# Patient Record
Sex: FEMALE | HISPANIC OR LATINO | Employment: FULL TIME | URBAN - METROPOLITAN AREA
[De-identification: names, ages, dates, MRNs, and addresses within clinical notes are randomized per-mention and may not be internally consistent; named-entity substitution may affect disease eponyms.]

---

## 2018-10-27 ENCOUNTER — HOSPITAL ENCOUNTER (INPATIENT)
Facility: MEDICAL CENTER | Age: 68
LOS: 5 days | DRG: 871 | End: 2018-11-01
Attending: EMERGENCY MEDICINE | Admitting: HOSPITALIST
Payer: MEDICARE

## 2018-10-27 ENCOUNTER — APPOINTMENT (OUTPATIENT)
Dept: RADIOLOGY | Facility: MEDICAL CENTER | Age: 68
DRG: 871 | End: 2018-10-27
Attending: EMERGENCY MEDICINE
Payer: MEDICARE

## 2018-10-27 DIAGNOSIS — N10 ACUTE PYELONEPHRITIS: ICD-10-CM

## 2018-10-27 DIAGNOSIS — E86.0 DEHYDRATION: ICD-10-CM

## 2018-10-27 DIAGNOSIS — M25.512 CHRONIC LEFT SHOULDER PAIN: ICD-10-CM

## 2018-10-27 DIAGNOSIS — A41.9 SEPSIS, DUE TO UNSPECIFIED ORGANISM: ICD-10-CM

## 2018-10-27 DIAGNOSIS — G89.29 CHRONIC LEFT SHOULDER PAIN: ICD-10-CM

## 2018-10-27 DIAGNOSIS — R79.89 ELEVATED TROPONIN: ICD-10-CM

## 2018-10-27 DIAGNOSIS — E78.5 HYPERLIPIDEMIA, UNSPECIFIED HYPERLIPIDEMIA TYPE: ICD-10-CM

## 2018-10-27 DIAGNOSIS — R73.9 HYPERGLYCEMIA: ICD-10-CM

## 2018-10-27 LAB
ALBUMIN SERPL BCP-MCNC: 4.1 G/DL (ref 3.2–4.9)
ALBUMIN/GLOB SERPL: 1.1 G/DL
ALP SERPL-CCNC: 63 U/L (ref 30–99)
ALT SERPL-CCNC: 17 U/L (ref 2–50)
ANION GAP SERPL CALC-SCNC: 13 MMOL/L (ref 0–11.9)
APPEARANCE UR: ABNORMAL
AST SERPL-CCNC: 13 U/L (ref 12–45)
BACTERIA #/AREA URNS HPF: ABNORMAL /HPF
BASOPHILS # BLD AUTO: 0.3 % (ref 0–1.8)
BASOPHILS # BLD: 0.05 K/UL (ref 0–0.12)
BILIRUB SERPL-MCNC: 0.9 MG/DL (ref 0.1–1.5)
BILIRUB UR QL STRIP.AUTO: NEGATIVE
BNP SERPL-MCNC: 49 PG/ML (ref 0–100)
BUN SERPL-MCNC: 22 MG/DL (ref 8–22)
CALCIUM SERPL-MCNC: 9.8 MG/DL (ref 8.5–10.5)
CHLORIDE SERPL-SCNC: 98 MMOL/L (ref 96–112)
CO2 SERPL-SCNC: 22 MMOL/L (ref 20–33)
COLOR UR: YELLOW
CREAT SERPL-MCNC: 1.3 MG/DL (ref 0.5–1.4)
EOSINOPHIL # BLD AUTO: 0 K/UL (ref 0–0.51)
EOSINOPHIL NFR BLD: 0 % (ref 0–6.9)
EPI CELLS #/AREA URNS HPF: ABNORMAL /HPF
ERYTHROCYTE [DISTWIDTH] IN BLOOD BY AUTOMATED COUNT: 37.7 FL (ref 35.9–50)
GLOBULIN SER CALC-MCNC: 3.7 G/DL (ref 1.9–3.5)
GLUCOSE SERPL-MCNC: 360 MG/DL (ref 65–99)
GLUCOSE UR STRIP.AUTO-MCNC: >=1000 MG/DL
HCT VFR BLD AUTO: 41.7 % (ref 37–47)
HGB BLD-MCNC: 15 G/DL (ref 12–16)
HYALINE CASTS #/AREA URNS LPF: ABNORMAL /LPF
IMM GRANULOCYTES # BLD AUTO: 0.08 K/UL (ref 0–0.11)
IMM GRANULOCYTES NFR BLD AUTO: 0.5 % (ref 0–0.9)
KETONES UR STRIP.AUTO-MCNC: ABNORMAL MG/DL
LACTATE BLD-SCNC: 1.7 MMOL/L (ref 0.5–2)
LEUKOCYTE ESTERASE UR QL STRIP.AUTO: ABNORMAL
LYMPHOCYTES # BLD AUTO: 1.2 K/UL (ref 1–4.8)
LYMPHOCYTES NFR BLD: 7.6 % (ref 22–41)
MAGNESIUM SERPL-MCNC: 1.8 MG/DL (ref 1.5–2.5)
MCH RBC QN AUTO: 31.8 PG (ref 27–33)
MCHC RBC AUTO-ENTMCNC: 36 G/DL (ref 33.6–35)
MCV RBC AUTO: 88.5 FL (ref 81.4–97.8)
MICRO URNS: ABNORMAL
MONOCYTES # BLD AUTO: 1.2 K/UL (ref 0–0.85)
MONOCYTES NFR BLD AUTO: 7.6 % (ref 0–13.4)
NEUTROPHILS # BLD AUTO: 13.36 K/UL (ref 2–7.15)
NEUTROPHILS NFR BLD: 84 % (ref 44–72)
NITRITE UR QL STRIP.AUTO: POSITIVE
NRBC # BLD AUTO: 0 K/UL
NRBC BLD-RTO: 0 /100 WBC
PH UR STRIP.AUTO: 5.5 [PH]
PLATELET # BLD AUTO: 219 K/UL (ref 164–446)
PMV BLD AUTO: 11.1 FL (ref 9–12.9)
POTASSIUM SERPL-SCNC: 3.8 MMOL/L (ref 3.6–5.5)
PROT SERPL-MCNC: 7.8 G/DL (ref 6–8.2)
PROT UR QL STRIP: 30 MG/DL
RBC # BLD AUTO: 4.71 M/UL (ref 4.2–5.4)
RBC # URNS HPF: ABNORMAL /HPF
RBC UR QL AUTO: ABNORMAL
SODIUM SERPL-SCNC: 133 MMOL/L (ref 135–145)
SP GR UR STRIP.AUTO: 1.02
TROPONIN I SERPL-MCNC: 0.23 NG/ML (ref 0–0.04)
UROBILINOGEN UR STRIP.AUTO-MCNC: 0.2 MG/DL
WBC # BLD AUTO: 15.9 K/UL (ref 4.8–10.8)
WBC #/AREA URNS HPF: ABNORMAL /HPF

## 2018-10-27 PROCEDURE — 80053 COMPREHEN METABOLIC PANEL: CPT

## 2018-10-27 PROCEDURE — 83880 ASSAY OF NATRIURETIC PEPTIDE: CPT

## 2018-10-27 PROCEDURE — 700111 HCHG RX REV CODE 636 W/ 250 OVERRIDE (IP): Performed by: EMERGENCY MEDICINE

## 2018-10-27 PROCEDURE — 84484 ASSAY OF TROPONIN QUANT: CPT

## 2018-10-27 PROCEDURE — 700105 HCHG RX REV CODE 258: Performed by: EMERGENCY MEDICINE

## 2018-10-27 PROCEDURE — 87186 SC STD MICRODIL/AGAR DIL: CPT

## 2018-10-27 PROCEDURE — 99285 EMERGENCY DEPT VISIT HI MDM: CPT

## 2018-10-27 PROCEDURE — 99223 1ST HOSP IP/OBS HIGH 75: CPT | Performed by: HOSPITALIST

## 2018-10-27 PROCEDURE — 93005 ELECTROCARDIOGRAM TRACING: CPT | Performed by: EMERGENCY MEDICINE

## 2018-10-27 PROCEDURE — 87077 CULTURE AEROBIC IDENTIFY: CPT

## 2018-10-27 PROCEDURE — 94760 N-INVAS EAR/PLS OXIMETRY 1: CPT

## 2018-10-27 PROCEDURE — 81001 URINALYSIS AUTO W/SCOPE: CPT

## 2018-10-27 PROCEDURE — 83735 ASSAY OF MAGNESIUM: CPT

## 2018-10-27 PROCEDURE — 71045 X-RAY EXAM CHEST 1 VIEW: CPT

## 2018-10-27 PROCEDURE — 96365 THER/PROPH/DIAG IV INF INIT: CPT

## 2018-10-27 PROCEDURE — 87040 BLOOD CULTURE FOR BACTERIA: CPT

## 2018-10-27 PROCEDURE — 83605 ASSAY OF LACTIC ACID: CPT

## 2018-10-27 PROCEDURE — 770006 HCHG ROOM/CARE - MED/SURG/GYN SEMI*

## 2018-10-27 PROCEDURE — 87502 INFLUENZA DNA AMP PROBE: CPT

## 2018-10-27 PROCEDURE — 85025 COMPLETE CBC W/AUTO DIFF WBC: CPT

## 2018-10-27 PROCEDURE — 87086 URINE CULTURE/COLONY COUNT: CPT

## 2018-10-27 RX ORDER — SODIUM CHLORIDE 9 MG/ML
30 INJECTION, SOLUTION INTRAVENOUS ONCE
Status: COMPLETED | OUTPATIENT
Start: 2018-10-27 | End: 2018-10-28

## 2018-10-27 RX ORDER — POLYETHYLENE GLYCOL 3350 17 G/17G
1 POWDER, FOR SOLUTION ORAL
Status: DISCONTINUED | OUTPATIENT
Start: 2018-10-27 | End: 2018-11-01 | Stop reason: HOSPADM

## 2018-10-27 RX ORDER — ONDANSETRON 2 MG/ML
4 INJECTION INTRAMUSCULAR; INTRAVENOUS EVERY 4 HOURS PRN
Status: DISCONTINUED | OUTPATIENT
Start: 2018-10-27 | End: 2018-11-01 | Stop reason: HOSPADM

## 2018-10-27 RX ORDER — SODIUM CHLORIDE 9 MG/ML
30 INJECTION, SOLUTION INTRAVENOUS
Status: DISCONTINUED | OUTPATIENT
Start: 2018-10-27 | End: 2018-10-28

## 2018-10-27 RX ORDER — SODIUM CHLORIDE 9 MG/ML
INJECTION, SOLUTION INTRAVENOUS CONTINUOUS
Status: DISCONTINUED | OUTPATIENT
Start: 2018-10-28 | End: 2018-10-28

## 2018-10-27 RX ORDER — SODIUM CHLORIDE 9 MG/ML
500 INJECTION, SOLUTION INTRAVENOUS
Status: DISCONTINUED | OUTPATIENT
Start: 2018-10-27 | End: 2018-10-30

## 2018-10-27 RX ORDER — DEXTROSE MONOHYDRATE 25 G/50ML
25 INJECTION, SOLUTION INTRAVENOUS
Status: DISCONTINUED | OUTPATIENT
Start: 2018-10-27 | End: 2018-10-28

## 2018-10-27 RX ORDER — BISACODYL 10 MG
10 SUPPOSITORY, RECTAL RECTAL
Status: DISCONTINUED | OUTPATIENT
Start: 2018-10-27 | End: 2018-11-01 | Stop reason: HOSPADM

## 2018-10-27 RX ORDER — AMOXICILLIN 250 MG
2 CAPSULE ORAL 2 TIMES DAILY
Status: DISCONTINUED | OUTPATIENT
Start: 2018-10-28 | End: 2018-11-01 | Stop reason: HOSPADM

## 2018-10-27 RX ORDER — HEPARIN SODIUM 5000 [USP'U]/ML
5000 INJECTION, SOLUTION INTRAVENOUS; SUBCUTANEOUS EVERY 8 HOURS
Status: DISCONTINUED | OUTPATIENT
Start: 2018-10-28 | End: 2018-11-01 | Stop reason: HOSPADM

## 2018-10-27 RX ORDER — ONDANSETRON 4 MG/1
4 TABLET, ORALLY DISINTEGRATING ORAL EVERY 4 HOURS PRN
Status: DISCONTINUED | OUTPATIENT
Start: 2018-10-27 | End: 2018-11-01 | Stop reason: HOSPADM

## 2018-10-27 RX ORDER — ASPIRIN 81 MG/1
324 TABLET, CHEWABLE ORAL ONCE
Status: COMPLETED | OUTPATIENT
Start: 2018-10-28 | End: 2018-10-28

## 2018-10-27 RX ADMIN — CEFTRIAXONE 2 G: 2 INJECTION, POWDER, FOR SOLUTION INTRAMUSCULAR; INTRAVENOUS at 22:15

## 2018-10-27 RX ADMIN — SODIUM CHLORIDE 2100 ML: 9 INJECTION, SOLUTION INTRAVENOUS at 22:15

## 2018-10-27 ASSESSMENT — PAIN SCALES - GENERAL: PAINLEVEL_OUTOF10: 10

## 2018-10-28 ENCOUNTER — APPOINTMENT (OUTPATIENT)
Dept: CARDIOLOGY | Facility: MEDICAL CENTER | Age: 68
DRG: 871 | End: 2018-10-28
Attending: INTERNAL MEDICINE
Payer: MEDICARE

## 2018-10-28 ENCOUNTER — APPOINTMENT (OUTPATIENT)
Dept: RADIOLOGY | Facility: MEDICAL CENTER | Age: 68
DRG: 871 | End: 2018-10-28
Attending: HOSPITALIST
Payer: MEDICARE

## 2018-10-28 PROBLEM — E78.5 HLD (HYPERLIPIDEMIA): Status: ACTIVE | Noted: 2018-10-28

## 2018-10-28 PROBLEM — I24.89 DEMAND ISCHEMIA (HCC): Status: ACTIVE | Noted: 2018-10-28

## 2018-10-28 PROBLEM — N12 PYELONEPHRITIS: Status: ACTIVE | Noted: 2018-10-28

## 2018-10-28 PROBLEM — N17.9 AKI (ACUTE KIDNEY INJURY) (HCC): Status: ACTIVE | Noted: 2018-10-28

## 2018-10-28 PROBLEM — A41.9 SEPSIS (HCC): Status: ACTIVE | Noted: 2018-10-28

## 2018-10-28 PROBLEM — J96.01 ACUTE HYPOXEMIC RESPIRATORY FAILURE (HCC): Status: ACTIVE | Noted: 2018-10-28

## 2018-10-28 PROBLEM — E11.9 DIABETES (HCC): Status: ACTIVE | Noted: 2018-10-28

## 2018-10-28 PROBLEM — I95.9 HYPOTENSION: Status: ACTIVE | Noted: 2018-10-28

## 2018-10-28 PROBLEM — E11.65 TYPE 2 DIABETES MELLITUS WITH HYPERGLYCEMIA (HCC): Status: ACTIVE | Noted: 2018-10-28

## 2018-10-28 LAB
ANION GAP SERPL CALC-SCNC: 9 MMOL/L (ref 0–11.9)
APTT PPP: 37.2 SEC (ref 24.7–36)
BUN SERPL-MCNC: 20 MG/DL (ref 8–22)
CALCIUM SERPL-MCNC: 7.3 MG/DL (ref 8.5–10.5)
CHLORIDE SERPL-SCNC: 106 MMOL/L (ref 96–112)
CHOLEST SERPL-MCNC: 105 MG/DL (ref 100–199)
CO2 SERPL-SCNC: 18 MMOL/L (ref 20–33)
CREAT SERPL-MCNC: 1.14 MG/DL (ref 0.5–1.4)
EKG IMPRESSION: NORMAL
ERYTHROCYTE [DISTWIDTH] IN BLOOD BY AUTOMATED COUNT: 41.1 FL (ref 35.9–50)
EST. AVERAGE GLUCOSE BLD GHB EST-MCNC: 243 MG/DL
FLUAV RNA SPEC QL NAA+PROBE: NEGATIVE
FLUBV RNA SPEC QL NAA+PROBE: NEGATIVE
GLUCOSE BLD-MCNC: 168 MG/DL (ref 65–99)
GLUCOSE BLD-MCNC: 200 MG/DL (ref 65–99)
GLUCOSE BLD-MCNC: 208 MG/DL (ref 65–99)
GLUCOSE SERPL-MCNC: 255 MG/DL (ref 65–99)
HBA1C MFR BLD: 10.1 % (ref 0–5.6)
HCT VFR BLD AUTO: 32.2 % (ref 37–47)
HDLC SERPL-MCNC: 21 MG/DL
HGB BLD-MCNC: 11.1 G/DL (ref 12–16)
INR PPP: 1.38 (ref 0.87–1.13)
LACTATE BLD-SCNC: 1.5 MMOL/L (ref 0.5–2)
LACTATE BLD-SCNC: 1.6 MMOL/L (ref 0.5–2)
LACTATE BLD-SCNC: 1.7 MMOL/L (ref 0.5–2)
LDLC SERPL CALC-MCNC: 60 MG/DL
LV EJECT FRACT  99904: 60
LV EJECT FRACT MOD 2C 99903: 70.75
LV EJECT FRACT MOD 4C 99902: 69.2
LV EJECT FRACT MOD BP 99901: 69.71
MCH RBC QN AUTO: 31.8 PG (ref 27–33)
MCHC RBC AUTO-ENTMCNC: 34.5 G/DL (ref 33.6–35)
MCV RBC AUTO: 92.3 FL (ref 81.4–97.8)
PLATELET # BLD AUTO: 150 K/UL (ref 164–446)
PMV BLD AUTO: 11.2 FL (ref 9–12.9)
POTASSIUM SERPL-SCNC: 3.7 MMOL/L (ref 3.6–5.5)
PROTHROMBIN TIME: 17.1 SEC (ref 12–14.6)
RBC # BLD AUTO: 3.49 M/UL (ref 4.2–5.4)
SODIUM SERPL-SCNC: 133 MMOL/L (ref 135–145)
TRIGL SERPL-MCNC: 120 MG/DL (ref 0–149)
TROPONIN I SERPL-MCNC: 0.2 NG/ML (ref 0–0.04)
TROPONIN I SERPL-MCNC: 0.58 NG/ML (ref 0–0.04)
TROPONIN I SERPL-MCNC: 0.85 NG/ML (ref 0–0.04)
TSH SERPL DL<=0.005 MIU/L-ACNC: 0.67 UIU/ML (ref 0.38–5.33)
WBC # BLD AUTO: 15.6 K/UL (ref 4.8–10.8)

## 2018-10-28 PROCEDURE — 83605 ASSAY OF LACTIC ACID: CPT

## 2018-10-28 PROCEDURE — 99223 1ST HOSP IP/OBS HIGH 75: CPT | Performed by: INTERNAL MEDICINE

## 2018-10-28 PROCEDURE — 700102 HCHG RX REV CODE 250 W/ 637 OVERRIDE(OP): Performed by: EMERGENCY MEDICINE

## 2018-10-28 PROCEDURE — 82962 GLUCOSE BLOOD TEST: CPT

## 2018-10-28 PROCEDURE — 85027 COMPLETE CBC AUTOMATED: CPT

## 2018-10-28 PROCEDURE — 700105 HCHG RX REV CODE 258: Performed by: HOSPITALIST

## 2018-10-28 PROCEDURE — 80061 LIPID PANEL: CPT

## 2018-10-28 PROCEDURE — 700102 HCHG RX REV CODE 250 W/ 637 OVERRIDE(OP): Performed by: HOSPITALIST

## 2018-10-28 PROCEDURE — 76775 US EXAM ABDO BACK WALL LIM: CPT

## 2018-10-28 PROCEDURE — 85610 PROTHROMBIN TIME: CPT

## 2018-10-28 PROCEDURE — 99233 SBSQ HOSP IP/OBS HIGH 50: CPT | Performed by: HOSPITALIST

## 2018-10-28 PROCEDURE — 84443 ASSAY THYROID STIM HORMONE: CPT

## 2018-10-28 PROCEDURE — 83036 HEMOGLOBIN GLYCOSYLATED A1C: CPT

## 2018-10-28 PROCEDURE — 700111 HCHG RX REV CODE 636 W/ 250 OVERRIDE (IP): Performed by: HOSPITALIST

## 2018-10-28 PROCEDURE — 93306 TTE W/DOPPLER COMPLETE: CPT | Mod: 26 | Performed by: INTERNAL MEDICINE

## 2018-10-28 PROCEDURE — A9270 NON-COVERED ITEM OR SERVICE: HCPCS | Performed by: HOSPITALIST

## 2018-10-28 PROCEDURE — 80048 BASIC METABOLIC PNL TOTAL CA: CPT

## 2018-10-28 PROCEDURE — 93306 TTE W/DOPPLER COMPLETE: CPT

## 2018-10-28 PROCEDURE — 84484 ASSAY OF TROPONIN QUANT: CPT

## 2018-10-28 PROCEDURE — 770022 HCHG ROOM/CARE - ICU (200)

## 2018-10-28 PROCEDURE — 85730 THROMBOPLASTIN TIME PARTIAL: CPT

## 2018-10-28 PROCEDURE — A9270 NON-COVERED ITEM OR SERVICE: HCPCS | Performed by: EMERGENCY MEDICINE

## 2018-10-28 RX ORDER — LISINOPRIL AND HYDROCHLOROTHIAZIDE 25; 20 MG/1; MG/1
1 TABLET ORAL DAILY
COMMUNITY

## 2018-10-28 RX ORDER — OXYCODONE HYDROCHLORIDE 5 MG/1
5 TABLET ORAL EVERY 4 HOURS PRN
Status: DISCONTINUED | OUTPATIENT
Start: 2018-10-28 | End: 2018-11-01 | Stop reason: HOSPADM

## 2018-10-28 RX ORDER — SODIUM CHLORIDE 9 MG/ML
500 INJECTION, SOLUTION INTRAVENOUS ONCE
Status: COMPLETED | OUTPATIENT
Start: 2018-10-28 | End: 2018-10-28

## 2018-10-28 RX ORDER — DEXTROSE MONOHYDRATE 25 G/50ML
25 INJECTION, SOLUTION INTRAVENOUS
Status: DISCONTINUED | OUTPATIENT
Start: 2018-10-28 | End: 2018-11-01 | Stop reason: HOSPADM

## 2018-10-28 RX ORDER — SODIUM CHLORIDE, SODIUM LACTATE, POTASSIUM CHLORIDE, CALCIUM CHLORIDE 600; 310; 30; 20 MG/100ML; MG/100ML; MG/100ML; MG/100ML
INJECTION, SOLUTION INTRAVENOUS CONTINUOUS
Status: DISCONTINUED | OUTPATIENT
Start: 2018-10-28 | End: 2018-10-29

## 2018-10-28 RX ORDER — ATORVASTATIN CALCIUM 10 MG/1
10 TABLET, FILM COATED ORAL NIGHTLY
COMMUNITY

## 2018-10-28 RX ORDER — ACETAMINOPHEN 500 MG
500 TABLET ORAL ONCE
Status: ACTIVE | OUTPATIENT
Start: 2018-10-28 | End: 2018-10-29

## 2018-10-28 RX ORDER — ATORVASTATIN CALCIUM 40 MG/1
40 TABLET, FILM COATED ORAL ONCE
Status: COMPLETED | OUTPATIENT
Start: 2018-10-28 | End: 2018-10-28

## 2018-10-28 RX ORDER — ATORVASTATIN CALCIUM 10 MG/1
10 TABLET, FILM COATED ORAL NIGHTLY
Status: DISCONTINUED | OUTPATIENT
Start: 2018-10-28 | End: 2018-11-01 | Stop reason: HOSPADM

## 2018-10-28 RX ORDER — OXYCODONE HYDROCHLORIDE 10 MG/1
10 TABLET ORAL EVERY 4 HOURS PRN
Status: DISCONTINUED | OUTPATIENT
Start: 2018-10-28 | End: 2018-11-01 | Stop reason: HOSPADM

## 2018-10-28 RX ORDER — GLIMEPIRIDE 4 MG/1
4 TABLET ORAL EVERY MORNING
COMMUNITY

## 2018-10-28 RX ORDER — INSULIN GLARGINE 100 [IU]/ML
10 INJECTION, SOLUTION SUBCUTANEOUS EVERY EVENING
Status: DISCONTINUED | OUTPATIENT
Start: 2018-10-28 | End: 2018-10-30

## 2018-10-28 RX ORDER — MORPHINE SULFATE 4 MG/ML
2 INJECTION, SOLUTION INTRAMUSCULAR; INTRAVENOUS ONCE
Status: COMPLETED | OUTPATIENT
Start: 2018-10-28 | End: 2018-10-28

## 2018-10-28 RX ORDER — SODIUM CHLORIDE 9 MG/ML
1000 INJECTION, SOLUTION INTRAVENOUS ONCE
Status: COMPLETED | OUTPATIENT
Start: 2018-10-28 | End: 2018-10-28

## 2018-10-28 RX ADMIN — ONDANSETRON 4 MG: 2 INJECTION INTRAMUSCULAR; INTRAVENOUS at 02:25

## 2018-10-28 RX ADMIN — HEPARIN SODIUM 5000 UNITS: 5000 INJECTION, SOLUTION INTRAVENOUS; SUBCUTANEOUS at 05:24

## 2018-10-28 RX ADMIN — HEPARIN SODIUM 5000 UNITS: 5000 INJECTION, SOLUTION INTRAVENOUS; SUBCUTANEOUS at 21:40

## 2018-10-28 RX ADMIN — MORPHINE SULFATE 2 MG: 4 INJECTION INTRAVENOUS at 02:25

## 2018-10-28 RX ADMIN — HEPARIN SODIUM 5000 UNITS: 5000 INJECTION, SOLUTION INTRAVENOUS; SUBCUTANEOUS at 15:21

## 2018-10-28 RX ADMIN — ASPIRIN 324 MG: 81 TABLET, CHEWABLE ORAL at 00:00

## 2018-10-28 RX ADMIN — OXYCODONE HYDROCHLORIDE 5 MG: 5 TABLET ORAL at 23:06

## 2018-10-28 RX ADMIN — OXYCODONE HYDROCHLORIDE 5 MG: 5 TABLET ORAL at 14:01

## 2018-10-28 RX ADMIN — INSULIN HUMAN 2 UNITS: 100 INJECTION, SOLUTION PARENTERAL at 05:24

## 2018-10-28 RX ADMIN — SODIUM CHLORIDE 500 ML: 9 INJECTION, SOLUTION INTRAVENOUS at 09:16

## 2018-10-28 RX ADMIN — SODIUM CHLORIDE 1000 ML: 9 INJECTION, SOLUTION INTRAVENOUS at 02:45

## 2018-10-28 RX ADMIN — CEFTRIAXONE SODIUM 2 G: 2 INJECTION, POWDER, FOR SOLUTION INTRAMUSCULAR; INTRAVENOUS at 21:41

## 2018-10-28 RX ADMIN — ATORVASTATIN CALCIUM 40 MG: 40 TABLET, FILM COATED ORAL at 08:52

## 2018-10-28 RX ADMIN — ATORVASTATIN CALCIUM 10 MG: 10 TABLET, FILM COATED ORAL at 21:40

## 2018-10-28 RX ADMIN — SODIUM CHLORIDE, POTASSIUM CHLORIDE, SODIUM LACTATE AND CALCIUM CHLORIDE: 600; 310; 30; 20 INJECTION, SOLUTION INTRAVENOUS at 15:21

## 2018-10-28 RX ADMIN — INSULIN GLARGINE 10 UNITS: 100 INJECTION, SOLUTION SUBCUTANEOUS at 23:04

## 2018-10-28 RX ADMIN — SODIUM CHLORIDE: 9 INJECTION, SOLUTION INTRAVENOUS at 03:05

## 2018-10-28 RX ADMIN — SODIUM CHLORIDE 500 ML: 9 INJECTION, SOLUTION INTRAVENOUS at 07:42

## 2018-10-28 ASSESSMENT — PATIENT HEALTH QUESTIONNAIRE - PHQ9
SUM OF ALL RESPONSES TO PHQ9 QUESTIONS 1 AND 2: 0
1. LITTLE INTEREST OR PLEASURE IN DOING THINGS: NOT AT ALL
2. FEELING DOWN, DEPRESSED, IRRITABLE, OR HOPELESS: NOT AT ALL

## 2018-10-28 ASSESSMENT — COPD QUESTIONNAIRES
COPD SCREENING SCORE: 2
DO YOU EVER COUGH UP ANY MUCUS OR PHLEGM?: NO/ONLY WITH OCCASIONAL COLDS OR INFECTIONS
HAVE YOU SMOKED AT LEAST 100 CIGARETTES IN YOUR ENTIRE LIFE: NO/DON'T KNOW
DO YOU EVER COUGH UP ANY MUCUS OR PHLEGM?: NO/ONLY WITH OCCASIONAL COLDS OR INFECTIONS
IN THE PAST 12 MONTHS DO YOU DO LESS THAN YOU USED TO BECAUSE OF YOUR BREATHING PROBLEMS: DISAGREE/UNSURE
HAVE YOU SMOKED AT LEAST 100 CIGARETTES IN YOUR ENTIRE LIFE: NO/DON'T KNOW
DURING THE PAST 4 WEEKS HOW MUCH DID YOU FEEL SHORT OF BREATH: NONE/LITTLE OF THE TIME
DURING THE PAST 4 WEEKS HOW MUCH DID YOU FEEL SHORT OF BREATH: NONE/LITTLE OF THE TIME

## 2018-10-28 ASSESSMENT — ENCOUNTER SYMPTOMS
FALLS: 0
HEADACHES: 0
HALLUCINATIONS: 0
VOMITING: 1
COUGH: 0
DIARRHEA: 0
NAUSEA: 1
DEPRESSION: 0
HEMOPTYSIS: 0
PSYCHIATRIC NEGATIVE: 1
SPEECH CHANGE: 0
BACK PAIN: 0
BLURRED VISION: 0
CHILLS: 0
SHORTNESS OF BREATH: 0
ABDOMINAL PAIN: 1
DIZZINESS: 0
WHEEZING: 0
DOUBLE VISION: 0
MYALGIAS: 0
SENSORY CHANGE: 0
BLOOD IN STOOL: 0
NECK PAIN: 1
PALPITATIONS: 0
WEAKNESS: 1
ABDOMINAL PAIN: 0
NAUSEA: 0
VOMITING: 0
EYE DISCHARGE: 0
BRUISES/BLEEDS EASILY: 0
DIZZINESS: 1
LOSS OF CONSCIOUSNESS: 0
FEVER: 0
FLANK PAIN: 1
HEADACHES: 1
WEAKNESS: 0
HEARTBURN: 0
FOCAL WEAKNESS: 0

## 2018-10-28 ASSESSMENT — PAIN SCALES - GENERAL
PAINLEVEL_OUTOF10: 5
PAINLEVEL_OUTOF10: 0
PAINLEVEL_OUTOF10: 0
PAINLEVEL_OUTOF10: 10
PAINLEVEL_OUTOF10: 5
PAINLEVEL_OUTOF10: 0
PAINLEVEL_OUTOF10: 7
PAINLEVEL_OUTOF10: 3
PAINLEVEL_OUTOF10: 3
PAINLEVEL_OUTOF10: 0
PAINLEVEL_OUTOF10: 6
PAINLEVEL_OUTOF10: 3

## 2018-10-28 ASSESSMENT — LIFESTYLE VARIABLES
EVER_SMOKED: NEVER
SUBSTANCE_ABUSE: 0
ALCOHOL_USE: NO
EVER_SMOKED: NEVER

## 2018-10-28 ASSESSMENT — COGNITIVE AND FUNCTIONAL STATUS - GENERAL
DAILY ACTIVITIY SCORE: 22
CLIMB 3 TO 5 STEPS WITH RAILING: A LITTLE
MOBILITY SCORE: 23
SUGGESTED CMS G CODE MODIFIER MOBILITY: CI
TOILETING: A LITTLE
SUGGESTED CMS G CODE MODIFIER DAILY ACTIVITY: CJ
HELP NEEDED FOR BATHING: A LITTLE

## 2018-10-28 NOTE — CARE PLAN
Problem: Communication  Goal: The ability to communicate needs accurately and effectively will improve  Outcome: PROGRESSING AS EXPECTED  Pt is mainly Maltese speaking but is able to verbalize needs and uses call light appropriately.     Problem: Pain Management  Goal: Pain level will decrease to patient's comfort goal  Outcome: PROGRESSING AS EXPECTED  Pt feels pain is well controlled with current pain medication regimen.

## 2018-10-28 NOTE — PROGRESS NOTES
Med rec complete per patient's home pharmacy  Patient did also have a prescription for Toujeo, but never picked up the medication  Unknown when last doses taken    Cindy Husain, PharmD

## 2018-10-28 NOTE — ASSESSMENT & PLAN NOTE
Troponin trended down  Echo revealed  No prior study is available for comparison.   Left ventricular ejection fraction is visually estimated to be 60%.  Mild AV sclerosis without reduced excursion.  Estimated right ventricular systolic pressure  is 33 mmHg.  Continue aspirin and Lipitor    Dr Tomas recommended MPI

## 2018-10-28 NOTE — PROGRESS NOTES
1100- Report received from FABIÁN Whittaker.   1215- Pt transferred to T620 for higher level of care with 2 RNs on [place]. Pt on 3 L O2. Pt's belongings and chart with pt during transfer. Family notified of change of rooms.

## 2018-10-28 NOTE — CONSULTS
Cardiology Initial Consultation    Date of Service  10/28/2018    Referring Physician  Michelle Moon M.D.    Reason for Consultation  Abnormal troponin    History of Presenting Illness  Evy Martinez is a 68 y.o. Female who presented 10/27/2018 with N/V, treated for UTI.  She had dysuria and left-sided flank pain and abdominal pain associated with fevers and chills and sweats.  She had intractable nausea and vomiting for several hours prior to admission.  She has hypertension and hyperlipidemia.  No prior known history of coronary disease.  She is met criteria for sepsis.    Troponin trend 0.23, 0.2, 0.85  LDL cholesterol 60 fasting glucose in the 200s, was 360 on admission  Creatinine 1.3    She is currently resting comfortably.  Her family is at the bedside and help with some translation.  She still having some abdominal discomfort but no more vomiting.  She reports chest pain only when she takes in a deep breath.  Prior to admission she was physically active and walks around the [employer] in [place].  She is never been limited by chest pain or pressure.  No significant dyspnea on exertion.  No palpitations prior to admission.  Right now she feels as though her heart is racing slightly.    We reviewed her troponin and her echocardiogram.  She is never had a stress test.    No family history of premature coronary disease.    Review of Systems  Review of Systems   All systems reviewed and are negative.      Past Medical History   has a past medical history of Diabetes (HCC); High cholesterol; and Hypertension.    Surgical History   has no past surgical history on file.    Family History  family history is not on file.    Social History   reports that she has never smoked. She has never used smokeless tobacco. She reports that she does not drink alcohol or use drugs.    Medications  None       Allergies  No Known Allergies    Vital signs in last 24 hours  Temp:  [36 °C (96.8 °F)-37.8 °C (100 °F)] 36.5 °C (97.7  °F)  Pulse:  [] 106  Resp:  [14-34] 20  BP: ()/(44-93) 74/44    Physical Exam  Physical Exam     General: No acute distress. Well nourished.  HEENT: EOM grossly intact, no scleral icterus, no pharyngeal erythema.   Neck:  No JVD, no bruits, trachea midline  CVS: Fast rate, regular rhythm. Normal S1, S2. No M/R/G. No LE edema.  2+ radial pulses, 2+ PT pulses  Resp: CTAB. No wheezing or crackles/rhonchi. Normal respiratory effort.  Abdomen: Soft, NT, no nash hepatomegaly, obese.  MSK/Ext: No clubbing or cyanosis.  Skin: Warm and dry, no rashes.  Neurological: CN III-XII grossly intact. No focal deficits.   Psych: A&O x 3, appropriate affect, good judgement      Lab Review  Lab Results   Component Value Date/Time    WBC 15.9 (H) 10/27/2018 10:47 PM    RBC 4.71 10/27/2018 10:47 PM    HEMOGLOBIN 15.0 10/27/2018 10:47 PM    HEMATOCRIT 41.7 10/27/2018 10:47 PM    MCV 88.5 10/27/2018 10:47 PM    MCH 31.8 10/27/2018 10:47 PM    MCHC 36.0 (H) 10/27/2018 10:47 PM    MPV 11.1 10/27/2018 10:47 PM      Lab Results   Component Value Date/Time    SODIUM 133 (L) 10/27/2018 10:47 PM    POTASSIUM 3.8 10/27/2018 10:47 PM    CHLORIDE 98 10/27/2018 10:47 PM    CO2 22 10/27/2018 10:47 PM    GLUCOSE 360 (H) 10/27/2018 10:47 PM    BUN 22 10/27/2018 10:47 PM    CREATININE 1.30 10/27/2018 10:47 PM      Lab Results   Component Value Date/Time    ASTSGOT 13 10/27/2018 10:47 PM    ALTSGPT 17 10/27/2018 10:47 PM     Lab Results   Component Value Date/Time    CHOLSTRLTOT 105 10/28/2018 07:52 AM    LDL 60 10/28/2018 07:52 AM    HDL 21 (A) 10/28/2018 07:52 AM    TRIGLYCERIDE 120 10/28/2018 07:52 AM    TROPONINI 0.85 (H) 10/28/2018 07:52 AM       Recent Labs      10/27/18   2247   BNPBTYPENAT  49       Cardiac Imaging and Procedures Review  EKG:  My personal interpretation of the EKG dated 10-28-18 is sinus tachy, 102, NS T wave changes    Echocardiogram: 10/20/2018 EF 60%, mild aortic valve sclerosis, RVSP 33 mmHg      Imaging  Chest  X-Ray:    No acute cardiopulmonary process 10/27/2018       Assessment/Plan  -UTI and probable pyelonephritis  -Septic shock with hypotension  -Abnormal troponin in the setting of septic shock, supply versus demand, not ACS  -Hypertension  -Hyperlipidemia      Plan:  -Echocardiogram very reassuring, prior to admission no cardiac symptoms.  -Treatment of septic shock and supportive care  -I recommend the primary service either order a perfusion nuclear stress test prior to discharge if she is feeling well enough arrange for this as an outpatient.  Continue primary prevention aspirin therapy, statin therapy and control of blood sugars.  -Please recall cardiology as needed. If the stress test is to be performed as an outpatient, please request the schedulers to have the patient follow-up with cardiology.    Discussed with Dr. Moon     Thank you for allowing me to participate in the care of this patient.    Cardiology will sign off on this patient    Please contact me with any questions.    Francy Tomas M.D.   Cardiologist, [facility]  [phone]

## 2018-10-28 NOTE — PROGRESS NOTES
Pt arrived on floor from ED at approximately 0200. When patients vitals were taken, patients BP was 170/93, , R 22, O2 sat 91% on room air and pt rated pain 10/10 in the lower abdomen. Dr Mayo was paged and ordered 1000 mL bolus of NS, 2mg Morphine and 500 mg Tylenol. After bolus was stared and morphine was administered, vitals were rechecked. Patients BP was 132/83, , R 34, O2 sat 81% on room air. Pt was placed on oxygen and was requiring 6L O2 to maintain 91% O2 saturation. This RN decided to call a rapid response at 0253. Rapid team arrived and determined patient was being treated appropriately.

## 2018-10-28 NOTE — ED TRIAGE NOTES
Pt reports 3 day history dysuria and frequency with low back pain, pt states she had a fever at home today, pt reports nausea and vomiting today,  Pt given emesis bag for waiting area, pt is with her family, pt ambulates with strong steady gait

## 2018-10-28 NOTE — H&P
Hospital Medicine History & Physical Note    Date of Service  10/27/2018    Primary Care Physician  Ailyn Shane M.D.    Consultants  none    Code Status  full    Chief Complaint  abd pain, n/v    History of Presenting Illness  68 y.o. female who presented 10/27/2018 with dysuria nausea vomiting.  Patient also had left-sided flank pain and abdominal pain.  Dysuria fevers chills and sweats.  Intractable nausea and vomiting over the last several hours.  Flank pain yesterday which is improved in the emergency department with pain medications.  Has a history of diabetes high cholesterol and hypertension.  No known alleviating or exacerbating factors to her symptoms.     Review of Systems  Review of Systems   Constitutional: Negative for chills and fever.   HENT: Negative for congestion, hearing loss and tinnitus.    Eyes: Negative for blurred vision, double vision and discharge.   Respiratory: Negative for cough, hemoptysis and shortness of breath.    Cardiovascular: Negative for chest pain, palpitations and leg swelling.   Gastrointestinal: Positive for abdominal pain, nausea and vomiting. Negative for heartburn.   Genitourinary: Positive for dysuria and flank pain.   Musculoskeletal: Negative for joint pain and myalgias.   Skin: Negative for rash.   Neurological: Negative for dizziness, sensory change, speech change, focal weakness and weakness.   Endo/Heme/Allergies: Negative for environmental allergies. Does not bruise/bleed easily.   Psychiatric/Behavioral: Negative for depression, hallucinations and substance abuse.       Past Medical History   has a past medical history of Diabetes (HCC); High cholesterol; and Hypertension.    Surgical History  Reviewed and noncontributory      Family History  Reviewed and noncontributory    Social History   reports that she has never smoked. She has never used smokeless tobacco. She reports that she does not drink alcohol or use drugs.    Allergies  No Known  Allergies    Medications  None       Physical Exam  Temp:  [36 °C (96.8 °F)] 36 °C (96.8 °F)  Pulse:  [103-122] 103  Resp:  [14-20] 20  BP: (104)/(71) 104/71    Physical Exam   Constitutional: She is oriented to person, place, and time. She appears well-developed and well-nourished. No distress.   HENT:   Head: Normocephalic and atraumatic.   Eyes: Pupils are equal, round, and reactive to light. Conjunctivae and EOM are normal.   Neck: Normal range of motion. Neck supple. No JVD present.   Cardiovascular: Regular rhythm, normal heart sounds and intact distal pulses.    No murmur heard.  tachycardic   Pulmonary/Chest: Effort normal and breath sounds normal. No respiratory distress. She has no wheezes.   Abdominal: Soft. Bowel sounds are normal. She exhibits no distension. There is tenderness.   llq ttp    Genitourinary:   Genitourinary Comments: Left flank ttp   Musculoskeletal: Normal range of motion. She exhibits no edema.   Neurological: She is alert and oriented to person, place, and time. She exhibits normal muscle tone.   Skin: Skin is warm and dry.   Psychiatric: She has a normal mood and affect. Her behavior is normal. Judgment and thought content normal.   Nursing note and vitals reviewed.      Laboratory:  Recent Labs      10/27/18   2247   WBC  15.9*   RBC  4.71   HEMOGLOBIN  15.0   HEMATOCRIT  41.7   MCV  88.5   MCH  31.8   MCHC  36.0*   RDW  37.7   PLATELETCT  219   MPV  11.1     Recent Labs      10/27/18   2247   SODIUM  133*   POTASSIUM  3.8   CHLORIDE  98   CO2  22   GLUCOSE  360*   BUN  22   CREATININE  1.30   CALCIUM  9.8     Recent Labs      10/27/18   2247   ALTSGPT  17   ASTSGOT  13   ALKPHOSPHAT  63   TBILIRUBIN  0.9   GLUCOSE  360*         Recent Labs      10/27/18   2247   BNPBTYPENAT  49         Recent Labs      10/27/18   2247   TROPONINI  0.23*       Urinalysis:    Recent Labs      10/27/18   2135   SPECGRAVITY  1.020   GLUCOSEUR  >=1000*   KETONES  Trace*   NITRITE  Positive*   LEUKESTERAS   Large*   WBCURINE  Packed*   RBCURINE  5-10*   BACTERIA  Many*   EPITHELCELL  Few        Imaging:  DX-CHEST-PORTABLE (1 VIEW)   Final Result      No acute cardiopulmonary abnormality.            Assessment/Plan:  I anticipate this patient will require at least two midnights for appropriate medical management, necessitating inpatient admission.    * Sepsis (HCC)   Assessment & Plan    This is sepsis (without associated acute organ dysfunction).   Due to pyelonephritis   Continue with IVF   Cont IV abx   Follow cultures, descilate therapy as appropriate   Lactic and trend        HLD (hyperlipidemia)   Assessment & Plan    Check lipid panel        Hypotension   Assessment & Plan    Improved with IVF   Cont IVF   Hold anti hypertensives        Diabetes (HCC)   Assessment & Plan    Uncontrolled with hyperglycemia   Check a1c   SSI ordered   Monitor BS's         Pyelonephritis   Assessment & Plan    IVF and pain control   Anti emetics   IV abx; descilate as appropriate  Will check renal ultrasound             VTE prophylaxis: heparin

## 2018-10-28 NOTE — PROGRESS NOTES
Pt to transfer to T620 for ICU monitoring. ICU RN at bedside to monitor patient until room is available.

## 2018-10-28 NOTE — PROGRESS NOTES
Vicenta in Lab called with critical result of troponin of 0.85 at 0848. Critical lab result read back to Vicenta.   Dr. Moon notified of critical lab result at 0850.  Critical lab result read back by Dr. Moon.

## 2018-10-28 NOTE — ASSESSMENT & PLAN NOTE
This is severe sepsis (with associated acute organ dysfunction) with demand ischemia and acute hypoxemic respiratory failure.   Source urinary  With E. coli bacteremia  Continue ceftriaxone repeat cultures

## 2018-10-28 NOTE — CARE PLAN
Problem: Communication  Goal: The ability to communicate needs accurately and effectively will improve  Outcome: PROGRESSING SLOWER THAN EXPECTED  Pt Danish speaking mostly, understands some English. AM assessment completed using translation phone. All questions answered.     Problem: Infection  Goal: Will remain free from infection  Outcome: PROGRESSING SLOWER THAN EXPECTED  Pt showing symptoms of sepsis, low bp and elevated HR. MD aware, awaiting possible transport orders. Pt asymptomatic with low bp, bed alarm on and pt educated not to get up without assistance due to low bp. Will continue to closely monitor pt.

## 2018-10-28 NOTE — ED PROVIDER NOTES
ED PROVIDER NOTE     Scribed for Rajinder Fox M.D. by Lydia Thomas. 10/27/2018, 9:49 PM.    CHIEF COMPLAINT  Chief Complaint   Patient presents with   • Painful Urination   • N/V       HPI    Primary care provider: Ailyn Shane M.D.  Means of arrival: Walk-in  History obtained from: Patient   History limited by: none    Evy Martinez is a 68 y.o. female who presents with painful urination and associated nausea and vomiting that began three days ago. Patient also notes associated fevers/chills and left sided back pain. Patient has taken Tylenol and Advil for her symptoms with mild relief of her symptoms. She has had poor intake secondary to her nausea and vomiting but has been able to eat some soup. She has never had similar episodes in the past. Patient denies any shortness of breath, cough, leg swelling, chest pain, or abdominal pain associated.  Patient has history of diabetes, high cholesterol, and hypertension. She denies allergies to medications.     REVIEW OF SYSTEMS  Constitutional: Positive for fever and chills.   Respiratory: Negative for cough or shortness of breath.    Cardiovascular: Negative for chest pain or palpitations or syncope.   Gastrointestinal: Negative for nausea, vomiting, abdominal pain.  Positive for loss of appetite and decreased oral intake  Genitourinary: Positive for painful urination.   Musculoskeletal: Positive left back pain. Negative for leg swelling  All other systems reviewed and are negative.  C.    PAST MEDICAL HISTORY   has a past medical history of Diabetes (HCC); High cholesterol; and Hypertension.    PAST FAMILY HISTORY  History reviewed. No pertinent family history.    SOCIAL HISTORY  Social History     Social History Main Topics   • Smoking status: Never Smoker   • Smokeless tobacco: Never Used   • Alcohol use No   • Drug use: No       SURGICAL HISTORY  patient denies any surgical history    CURRENT MEDICATIONS  Tylenol and Advil.     ALLERGIES  No  Known Allergies    PHYSICAL EXAM  VITAL SIGNS: /71   Pulse (!) 122   Temp 36 °C (96.8 °F)   Resp 14   Ht 1.524 m (5')   Wt 70 kg (154 lb 5.2 oz)   SpO2 94%   BMI 30.14 kg/m²    Pulse ox interpretation: On room air, I interpret this pulse ox as normal.  Constitutional: Sitting up in mild distress.  HEENT: Normocephalic, atraumatic. Posterior pharynx clear, mucous membranes dry.  Eyes:  EOMI. Normal sclera.  Neck: Supple, Full range of motion, nontender.  Chest/Pulmonary: Clear to ausculation bilaterally, no wheezes or rhonchi.  Cardiovascular: Tachycardic rate and rhythm, no murmur.   Abdomen: Soft, suprapubic tenderness, no rebound, guarding, or masses.  Back: Left CVA tenderness, nontender midline, no step offs.  Musculoskeletal: No deformity, no edema.  Neuro: Clear speech, normal coordination, cranial nerves II-XII grossly intact.  Psych: Normal mood and affect.  Skin: No rashes, warm and dry.    DIAGNOSTIC STUDIES / PROCEDURES    LABS & EKG  Results for orders placed or performed during the hospital encounter of 10/27/18   URINALYSIS CULTURE, IF INDICATED   Result Value Ref Range    Color Yellow     Character Turbid (A)     Specific Gravity 1.020 <1.035    Ph 5.5 5.0 - 8.0    Glucose >=1000 (A) Negative mg/dL    Ketones Trace (A) Negative mg/dL    Protein 30 (A) Negative mg/dL    Bilirubin Negative Negative    Urobilinogen, Urine 0.2 Negative    Nitrite Positive (A) Negative    Leukocyte Esterase Large (A) Negative    Occult Blood Small (A) Negative    Micro Urine Req Microscopic    CBC WITH DIFFERENTIAL   Result Value Ref Range    WBC 15.9 (H) 4.8 - 10.8 K/uL    RBC 4.71 4.20 - 5.40 M/uL    Hemoglobin 15.0 12.0 - 16.0 g/dL    Hematocrit 41.7 37.0 - 47.0 %    MCV 88.5 81.4 - 97.8 fL    MCH 31.8 27.0 - 33.0 pg    MCHC 36.0 (H) 33.6 - 35.0 g/dL    RDW 37.7 35.9 - 50.0 fL    Platelet Count 219 164 - 446 K/uL    MPV 11.1 9.0 - 12.9 fL    Neutrophils-Polys 84.00 (H) 44.00 - 72.00 %    Lymphocytes 7.60  (L) 22.00 - 41.00 %    Monocytes 7.60 0.00 - 13.40 %    Eosinophils 0.00 0.00 - 6.90 %    Basophils 0.30 0.00 - 1.80 %    Immature Granulocytes 0.50 0.00 - 0.90 %    Nucleated RBC 0.00 /100 WBC    Neutrophils (Absolute) 13.36 (H) 2.00 - 7.15 K/uL    Lymphs (Absolute) 1.20 1.00 - 4.80 K/uL    Monos (Absolute) 1.20 (H) 0.00 - 0.85 K/uL    Eos (Absolute) 0.00 0.00 - 0.51 K/uL    Baso (Absolute) 0.05 0.00 - 0.12 K/uL    Immature Granulocytes (abs) 0.08 0.00 - 0.11 K/uL    NRBC (Absolute) 0.00 K/uL   COMP METABOLIC PANEL   Result Value Ref Range    Sodium 133 (L) 135 - 145 mmol/L    Potassium 3.8 3.6 - 5.5 mmol/L    Chloride 98 96 - 112 mmol/L    Co2 22 20 - 33 mmol/L    Anion Gap 13.0 (H) 0.0 - 11.9    Glucose 360 (H) 65 - 99 mg/dL    Bun 22 8 - 22 mg/dL    Creatinine 1.30 0.50 - 1.40 mg/dL    Calcium 9.8 8.5 - 10.5 mg/dL    AST(SGOT) 13 12 - 45 U/L    ALT(SGPT) 17 2 - 50 U/L    Alkaline Phosphatase 63 30 - 99 U/L    Total Bilirubin 0.9 0.1 - 1.5 mg/dL    Albumin 4.1 3.2 - 4.9 g/dL    Total Protein 7.8 6.0 - 8.2 g/dL    Globulin 3.7 (H) 1.9 - 3.5 g/dL    A-G Ratio 1.1 g/dL   LACTIC ACID   Result Value Ref Range    Lactic Acid 1.7 0.5 - 2.0 mmol/L   LACTIC ACID   Result Value Ref Range    Lactic Acid 1.6 0.5 - 2.0 mmol/L   TROPONIN   Result Value Ref Range    Troponin I 0.23 (H) 0.00 - 0.04 ng/mL   BTYPE NATRIURETIC PEPTIDE   Result Value Ref Range    B Natriuretic Peptide 49 0 - 100 pg/mL   MAGNESIUM   Result Value Ref Range    Magnesium 1.8 1.5 - 2.5 mg/dL   INFLUENZA A/B BY PCR   Result Value Ref Range    Influenza virus A RNA Negative Negative    Influenza virus B, PCR Negative Negative   URINE MICROSCOPIC (W/UA)   Result Value Ref Range    WBC Packed (A) /hpf    RBC 5-10 (A) /hpf    Bacteria Many (A) None /hpf    Epithelial Cells Few /hpf    Hyaline Cast 0-2 /lpf   ESTIMATED GFR   Result Value Ref Range    GFR If  49 (A) >60 mL/min/1.73 m 2    GFR If Non African American 40 (A) >60 mL/min/1.73 m 2    TROPONIN   Result Value Ref Range    Troponin I 0.20 (H) 0.00 - 0.04 ng/mL   EKG   Result Value Ref Range    Report       [facility] Emergency Dept.    Test Date:  2018-10-27  Pt Name:    KENY CARRILLO                Department: ER  MRN:        [id number]                      Room:       S519  Gender:     Female                       Technician: [id number]  :        [id number]                   Requested By:RAJINDER DEL VALLE  Order #:    [id number]                    Reading MD: Rajinder Del Valle MD    Measurements  Intervals                                Axis  Rate:       102                          P:          28  MT:         140                          QRS:        18  QRSD:       72                           T:          19  QT:         356  QTc:        464    Interpretive Statements  SINUS TACHYCARDIA  BORDERLINE T ABNORMALITIES, ANTERIOR LEADS  No previous ECG available for comparison  No STEMI or strain or dysrhythmia  Electronically Signed On 10- 3:15:45 PDT by Rajinder Del Valle MD         RADIOLOGY  DX-CHEST-PORTABLE (1 VIEW)   Final Result      No acute cardiopulmonary abnormality.      US-RENAL    (Results Pending)       COURSE & MEDICAL DECISION MAKING    This is a 68 y.o. female who presents with painful urination and associated nausea and vomiting that began three days ago.    Differential Diagnosis includes but is not limited to:  UTI, sepsis, pyelonephritis, ACS, DKA.     ED Course:  10:00 PM - Patient seen and examined at bedside. Discussed with patient that I will order laboratory and radiology tests to further evaluate.  The patient will be resuscitated with 30 cc/kg NS IV bolus for concern for sepsis, dehydration, and needs to be NPO; she will be medicated with 2g in NS 100ml IVPB Rocephin for a presumed urinary source of infection. Ordered for lactic acid, CMP, blood culture, Urinalysis, urine culture, Troponin, BNP, EKG, troponin, magnesium, DX-chest,  to evaluate her  symptoms.     EKG shows sinus tachycardia without obvious STEMI.  Interestingly, the patient has a indeterminate troponin elevation to 0.23.  On immediate reassessment of this lab value she was rechecked and still denies any active chest pain whatsoever.  Urinalysis grossly infected with packed white blood cells and strong markers of infection.  She has a elevated white blood cell count to 16 with a left shift, her electrolytes are significant for hyperglycemia and mild anion gap acidosis, but no lactic acidosis.  Aspirin is ordered after indeterminate troponin elevation noted.    11:43 PM Recheck: Patient re-evaluated at [facility]. Patient reports feeling improved and appears clinically improved after IV fluids as her heart rate is now normal. Discussed patient's condition, my presumed diagnosis of acute pyelonephritis, and treatment plan including admission.    11:47 PM - I discussed the patient's case and the above findings with Dr. Mayo (Hospitalist) who kindly agrees to admit the patient for further workup and treatment.  I feel that she is hemodynamically stable for admission to the telemetry unit in guarded condition.    Medications   NS infusion 2,100 mL (not administered)   NS (BOLUS) infusion 500 mL (not administered)   cefTRIAXone (ROCEPHIN) 2 g in  mL IVPB (not administered)   senna-docusate (PERICOLACE or SENOKOT S) 8.6-50 MG per tablet 2 Tab (not administered)     And   polyethylene glycol/lytes (MIRALAX) PACKET 1 Packet (not administered)     And   magnesium hydroxide (MILK OF MAGNESIA) suspension 30 mL (not administered)     And   bisacodyl (DULCOLAX) suppository 10 mg (not administered)   NS infusion ( Intravenous New Bag 10/28/18 0305)   ondansetron (ZOFRAN) syringe/vial injection 4 mg (4 mg Intravenous Given 10/28/18 0225)   ondansetron (ZOFRAN ODT) dispertab 4 mg (not administered)   heparin injection 5,000 Units (not administered)   insulin regular (HUMULIN R) injection 1-6 Units (1 Units  Subcutaneous Missed 10/28/18 0015)     And   glucose 4 g chewable tablet 16 g (not administered)     And   dextrose 50% (D50W) injection 25 mL (not administered)   acetaminophen (TYLENOL) tablet 500 mg (not administered)   NS (BOLUS) infusion 1,000 mL (not administered)   cefTRIAXone (ROCEPHIN) 2 g in  mL IVPB (0 g Intravenous Stopped 10/27/18 2349)   NS infusion 2,100 mL (0 mL/kg × 70 kg Intravenous Stopped 10/28/18 0044)   aspirin (ASA) chewable tab 324 mg (324 mg Oral Given 10/28/18 0000)   morphine (pf) 4 mg/ml injection 2 mg (2 mg Intravenous Given 10/28/18 0225)       FINAL IMPRESSION  1. Elevated troponin    2. Sepsis, due to unspecified organism (HCC)    3. Acute pyelonephritis    4. Dehydration    5. Hyperlipidemia, unspecified hyperlipidemia type    6. Hyperglycemia      -ADMIT-    Pertinent Labs & Imaging studies reviewed and verified by myself, as well as nursing notes and the patient's past medical, family, and social histories (See chart for details).    Results, exam findings, clinical impression, presumed diagnosis, treatment options, and plan for admission were discussed with the patient and  and they verbalized understanding, agreed with, and appreciated the plan of care.    Lydia LONDONO (Scribe), am scribing for, and in the presence of, Rajinder Fox M.D..    Electronically signed by: Lydia Thomas (Scribe), 10/27/2018    Rajinder LONDONO M.D. personally performed the services described in this documentation, as scribed by Lydia Thomas in my presence, and it is both accurate and complete.    Portions of this record were made with voice recognition software.  Despite my review, spelling/grammar/context errors may still remain. Interpretation of this chart should be taken in this context.    The note accurately reflects work and decisions made by me.  Rajinder Fox  10/28/2018  3:18 AM

## 2018-10-28 NOTE — PROGRESS NOTES
[facility] Hospitalist Progress Note    Date of Service: 10/28/2018    Chief Complaint  68 y.o. female admitted 10/27/2018 with sepsis 2/2 pyelonephritis. Hypotensive, not responsive to IVF boluses    Interval Problem Update  Utilized language line  phone for bedside interview with patient.  Feeling lightheaded, with headache. Denies active chest pain. Rapid response overnight for oxygen desaturation and , required 6L to maintain 91%.     Med rec has not been completed yet, consult to pharmacy.    Consultants/Specialty  Cardiology  Pulm/Critical Care    Disposition  Transfer to ICU        Review of Systems   Constitutional: Positive for malaise/fatigue. Negative for chills and fever.   HENT: Negative for congestion.    Respiratory: Negative for cough, shortness of breath and wheezing.    Cardiovascular: Negative for chest pain, palpitations and leg swelling.   Gastrointestinal: Positive for nausea. Negative for abdominal pain, blood in stool and vomiting.   Genitourinary: Positive for dysuria and flank pain. Negative for hematuria.   Musculoskeletal: Positive for joint pain. Negative for falls.   Neurological: Positive for dizziness, weakness and headaches. Negative for loss of consciousness.   Psychiatric/Behavioral: Negative.    All other systems reviewed and are negative.     Physical Exam  Laboratory/Imaging   Hemodynamics  Temp (24hrs), Av.1 °C (98.8 °F), Min:36 °C (96.8 °F), Max:37.8 °C (100 °F)   Temperature: 37.6 °C (99.7 °F)  Pulse  Av.6  Min: 95  Max: 154 Heart Rate (Monitored): 94  Blood Pressure : (!) 90/62, NIBP: 129/74      Respiratory      Respiration: (!) 22, Pulse Oximetry: 99 %        RUL Breath Sounds: Clear, RML Breath Sounds: Diminished, RLL Breath Sounds: Diminished, JL Breath Sounds: Clear, LLL Breath Sounds: Diminished    Fluids  No intake or output data in the 24 hours ending 10/28/18 0632    Nutrition  Orders Placed This Encounter   Procedures   • Diet Order Regular      Standing Status:   Standing     Number of Occurrences:   1     Order Specific Question:   Diet:     Answer:   Regular [1]     Physical Exam   Constitutional: She is oriented to person, place, and time. She appears well-developed. No distress.   HENT:   Head: Normocephalic and atraumatic.   Mucous membranes tacky   Eyes: Pupils are equal, round, and reactive to light. EOM are normal. Right eye exhibits no discharge. Left eye exhibits no discharge. No scleral icterus.   Neck: Normal range of motion. Neck supple. No JVD present. No tracheal deviation present.   Cardiovascular: Regular rhythm and intact distal pulses.  Tachycardia present.    Pulmonary/Chest: Effort normal. No respiratory distress. She has no wheezes. She has no rales.   Diminished at the bases   Abdominal: Soft. She exhibits no distension. There is no tenderness.   Left flank pain   Musculoskeletal: She exhibits no edema or tenderness.   Neurological: She is alert and oriented to person, place, and time.   Skin: Skin is warm. She is diaphoretic.   Psychiatric: She has a normal mood and affect. Her behavior is normal.   Vitals reviewed.      Recent Labs      10/27/18   2247   WBC  15.9*   RBC  4.71   HEMOGLOBIN  15.0   HEMATOCRIT  41.7   MCV  88.5   MCH  31.8   MCHC  36.0*   RDW  37.7   PLATELETCT  219   MPV  11.1     Recent Labs      10/27/18   2247   SODIUM  133*   POTASSIUM  3.8   CHLORIDE  98   CO2  22   GLUCOSE  360*   BUN  22   CREATININE  1.30   CALCIUM  9.8         Recent Labs      10/27/18   2247   BNPBTYPENAT  49              Assessment/Plan     * Sepsis (HCC)- (present on admission)   Assessment & Plan    This is severe sepsis (with associated acute organ dysfunction) with demand ischemia and acute hypoxemic respiratory failure. Secondary to pyelonephritis. Elevated WBC at 15.9, tachycardia and hypotension. No lactic acidosis. Rapid response called overnight for hypoxia requiring 5-6L and tachycardia to 140.  - s/p weight based bolus,  additional boluses given overnight and this AM  - transfer to ICU for BP monitoring and possible pressor support   - continue with ceftriaxone  - f/u blood and urine cultures    I examined the patient 10/28/2018 07:16 AM  Vital Signs:BP (!) 93/63   Pulse 93   Temp 36.7 °C (98 °F)   Resp 16   Ht 1.524 m (5')   Wt 70 kg (154 lb 5.2 oz)   SpO2 98%   Breastfeeding? No   BMI 30.14 kg/m²    Cardiac examination significant for Tachycardia  Pulmonary examination significant for diminished at the bases  Capillary refill is brisk  Peripheral Pulse is 2+   Skin is normal        Pyelonephritis- (present on admission)   Assessment & Plan    Urinalysis positive and with left flank pain.   - management as above  -  Anti emetics and pain control        Type 2 diabetes mellitus with hyperglycemia (HCC)- (present on admission)   Assessment & Plan    Uncontrolled and with hyperglycemia. Takes metformin and glimepiride. Per pharmacy, was given a script for Toujeo but had not been picked up yet. Trace ketones in the urine. Current elevation in blood sugars likely exacerbated by pyelo.   - A1C pending  - start lantus 10U  - insulin sliding scale  - hypoglycemia protocol  - DM diet        Demand ischemia (HCC)- (present on admission)   Assessment & Plan    Elevated trop from 0.2 to 0.85 since admission. EKG with no ischemic changes. Likely from sepsis and poor perfusion. No active chest pain.  - cardiology consulted  - trending trop  - echo pending        CHRISTIAN (acute kidney injury) (HCC)- (present on admission)   Assessment & Plan    Baseline unknown. CHRISTIAN from poor perfusion vs baseline? Cr at 1.3 on admission.  - s/p aggressive fluids overnight  - repeat in AM  - renal U/S given pyelo pending  - avoid nephrotoxic agents and renally dose medications        Acute hypoxemic respiratory failure (HCC)- (present on admission)   Assessment & Plan    No oxygen requirements on admission, then required 6L to maintain 91%. Rapid response was  called overnight. CXR normal.  - RT to follow  - pain control for likely atelectasis  - incentive spirometer        HLD (hyperlipidemia)- (present on admission)   Assessment & Plan    Good repeat lipid panel. LDL 60   - tolerating atorvastatin, will continue        Hypotension- (present on admission)   Assessment & Plan    Hypotensive on admission, initially responded to IVF but continuing to trend down overnight despite additional boluses.  - 2.1L in ED, additional 2L in boluses given  - hold home anti hypertensives          Quality-Core Measures

## 2018-10-28 NOTE — PROGRESS NOTES
Two RN skin check complete with FABIÁN Pinon. Small abrasion noted to right posterior ankle. No other wounds or skin abnormalities noted.

## 2018-10-29 PROBLEM — I47.19 MULTIFOCAL ATRIAL TACHYCARDIA (HCC): Status: ACTIVE | Noted: 2018-10-29

## 2018-10-29 LAB
ANION GAP SERPL CALC-SCNC: 6 MMOL/L (ref 0–11.9)
BASOPHILS # BLD AUTO: 0.3 % (ref 0–1.8)
BASOPHILS # BLD: 0.04 K/UL (ref 0–0.12)
BUN SERPL-MCNC: 13 MG/DL (ref 8–22)
CALCIUM SERPL-MCNC: 7.9 MG/DL (ref 8.5–10.5)
CHLORIDE SERPL-SCNC: 104 MMOL/L (ref 96–112)
CO2 SERPL-SCNC: 23 MMOL/L (ref 20–33)
CREAT SERPL-MCNC: 0.85 MG/DL (ref 0.5–1.4)
EKG IMPRESSION: NORMAL
EOSINOPHIL # BLD AUTO: 0.06 K/UL (ref 0–0.51)
EOSINOPHIL NFR BLD: 0.5 % (ref 0–6.9)
ERYTHROCYTE [DISTWIDTH] IN BLOOD BY AUTOMATED COUNT: 39.9 FL (ref 35.9–50)
GLUCOSE BLD-MCNC: 155 MG/DL (ref 65–99)
GLUCOSE BLD-MCNC: 165 MG/DL (ref 65–99)
GLUCOSE BLD-MCNC: 171 MG/DL (ref 65–99)
GLUCOSE BLD-MCNC: 198 MG/DL (ref 65–99)
GLUCOSE BLD-MCNC: 220 MG/DL (ref 65–99)
GLUCOSE SERPL-MCNC: 219 MG/DL (ref 65–99)
HCT VFR BLD AUTO: 31.7 % (ref 37–47)
HGB BLD-MCNC: 10.8 G/DL (ref 12–16)
IMM GRANULOCYTES # BLD AUTO: 0.11 K/UL (ref 0–0.11)
IMM GRANULOCYTES NFR BLD AUTO: 0.9 % (ref 0–0.9)
LYMPHOCYTES # BLD AUTO: 1.95 K/UL (ref 1–4.8)
LYMPHOCYTES NFR BLD: 16.6 % (ref 22–41)
MCH RBC QN AUTO: 31.1 PG (ref 27–33)
MCHC RBC AUTO-ENTMCNC: 34.1 G/DL (ref 33.6–35)
MCV RBC AUTO: 91.4 FL (ref 81.4–97.8)
MONOCYTES # BLD AUTO: 1.22 K/UL (ref 0–0.85)
MONOCYTES NFR BLD AUTO: 10.4 % (ref 0–13.4)
NEUTROPHILS # BLD AUTO: 8.4 K/UL (ref 2–7.15)
NEUTROPHILS NFR BLD: 71.3 % (ref 44–72)
NRBC # BLD AUTO: 0 K/UL
NRBC BLD-RTO: 0 /100 WBC
PLATELET # BLD AUTO: 160 K/UL (ref 164–446)
PMV BLD AUTO: 11.7 FL (ref 9–12.9)
POTASSIUM SERPL-SCNC: 3.5 MMOL/L (ref 3.6–5.5)
RBC # BLD AUTO: 3.47 M/UL (ref 4.2–5.4)
SODIUM SERPL-SCNC: 133 MMOL/L (ref 135–145)
WBC # BLD AUTO: 11.8 K/UL (ref 4.8–10.8)

## 2018-10-29 PROCEDURE — 99233 SBSQ HOSP IP/OBS HIGH 50: CPT | Performed by: HOSPITALIST

## 2018-10-29 PROCEDURE — 82962 GLUCOSE BLOOD TEST: CPT | Mod: 91

## 2018-10-29 PROCEDURE — 99232 SBSQ HOSP IP/OBS MODERATE 35: CPT | Performed by: INTERNAL MEDICINE

## 2018-10-29 PROCEDURE — 700102 HCHG RX REV CODE 250 W/ 637 OVERRIDE(OP): Performed by: HOSPITALIST

## 2018-10-29 PROCEDURE — 700102 HCHG RX REV CODE 250 W/ 637 OVERRIDE(OP): Performed by: INTERNAL MEDICINE

## 2018-10-29 PROCEDURE — 93005 ELECTROCARDIOGRAM TRACING: CPT | Performed by: HOSPITALIST

## 2018-10-29 PROCEDURE — 85025 COMPLETE CBC W/AUTO DIFF WBC: CPT

## 2018-10-29 PROCEDURE — 700105 HCHG RX REV CODE 258: Performed by: HOSPITALIST

## 2018-10-29 PROCEDURE — 700111 HCHG RX REV CODE 636 W/ 250 OVERRIDE (IP): Performed by: HOSPITALIST

## 2018-10-29 PROCEDURE — 80048 BASIC METABOLIC PNL TOTAL CA: CPT

## 2018-10-29 PROCEDURE — 93010 ELECTROCARDIOGRAM REPORT: CPT | Performed by: INTERNAL MEDICINE

## 2018-10-29 PROCEDURE — A9270 NON-COVERED ITEM OR SERVICE: HCPCS | Performed by: HOSPITALIST

## 2018-10-29 PROCEDURE — 770022 HCHG ROOM/CARE - ICU (200)

## 2018-10-29 PROCEDURE — A9270 NON-COVERED ITEM OR SERVICE: HCPCS | Performed by: INTERNAL MEDICINE

## 2018-10-29 RX ADMIN — HEPARIN SODIUM 5000 UNITS: 5000 INJECTION, SOLUTION INTRAVENOUS; SUBCUTANEOUS at 05:43

## 2018-10-29 RX ADMIN — INSULIN GLARGINE 10 UNITS: 100 INJECTION, SOLUTION SUBCUTANEOUS at 18:30

## 2018-10-29 RX ADMIN — Medication 2 TABLET: at 18:33

## 2018-10-29 RX ADMIN — SODIUM CHLORIDE, POTASSIUM CHLORIDE, SODIUM LACTATE AND CALCIUM CHLORIDE: 600; 310; 30; 20 INJECTION, SOLUTION INTRAVENOUS at 02:00

## 2018-10-29 RX ADMIN — HEPARIN SODIUM 5000 UNITS: 5000 INJECTION, SOLUTION INTRAVENOUS; SUBCUTANEOUS at 21:24

## 2018-10-29 RX ADMIN — ATORVASTATIN CALCIUM 10 MG: 10 TABLET, FILM COATED ORAL at 21:24

## 2018-10-29 RX ADMIN — DILTIAZEM HYDROCHLORIDE 30 MG: 30 TABLET, FILM COATED ORAL at 06:21

## 2018-10-29 RX ADMIN — HEPARIN SODIUM 5000 UNITS: 5000 INJECTION, SOLUTION INTRAVENOUS; SUBCUTANEOUS at 14:49

## 2018-10-29 RX ADMIN — CEFTRIAXONE SODIUM 2 G: 2 INJECTION, POWDER, FOR SOLUTION INTRAMUSCULAR; INTRAVENOUS at 21:24

## 2018-10-29 ASSESSMENT — ENCOUNTER SYMPTOMS
DIARRHEA: 0
FEVER: 0
CARDIOVASCULAR NEGATIVE: 1
DIZZINESS: 0
BACK PAIN: 0
COUGH: 0
SHORTNESS OF BREATH: 0
LOSS OF CONSCIOUSNESS: 0
HEADACHES: 0
ABDOMINAL PAIN: 0
VOMITING: 0
CHILLS: 0
NAUSEA: 0
NECK PAIN: 1
SHORTNESS OF BREATH: 1

## 2018-10-29 ASSESSMENT — PAIN SCALES - GENERAL
PAINLEVEL_OUTOF10: 0
PAINLEVEL_OUTOF10: 0
PAINLEVEL_OUTOF10: 2
PAINLEVEL_OUTOF10: 0
PAINLEVEL_OUTOF10: 2
PAINLEVEL_OUTOF10: 0

## 2018-10-29 NOTE — PROGRESS NOTES
[facility] Hospitalist Progress Note    Date of Service: 10/28/2018    Chief Complaint  68 y.o. female admitted 10/27/2018 with N, V, flank pain and F/Cs.  W/u showing dirty urine.  Hypotensive in ED and Dx of urosepsis/Pyelo.  Admitted to CICU    Interval Problem Update  Feeling better.  No more F or C.  conts to have some flank pain.  alos c/o neck and shoulder pain which is chronic for her    Consultants/Specialty      Disposition  Cont in ICU as pressures borderline        Review of Systems   Constitutional: Negative for chills and fever.   Respiratory: Negative for cough and shortness of breath.    Cardiovascular: Negative for chest pain.   Gastrointestinal: Negative for abdominal pain, diarrhea, nausea and vomiting.   Musculoskeletal: Positive for joint pain and neck pain. Negative for back pain.   Skin: Negative for rash.   Neurological: Negative for dizziness, loss of consciousness and headaches.      Physical Exam  Laboratory/Imaging   Hemodynamics  Temp (24hrs), Av °C (98.6 °F), Min:36 °C (96.8 °F), Max:37.8 °C (100 °F)   Temperature: 36.7 °C (98 °F)  Pulse  Av.3  Min: 86  Max: 154 Heart Rate (Monitored): 97  Blood Pressure : (!) 95/63, NIBP: 107/60      Respiratory      Respiration: (!) 21, Pulse Oximetry: 99 %        RUL Breath Sounds: Clear, RML Breath Sounds: Diminished, RLL Breath Sounds: Diminished, JL Breath Sounds: Clear, LLL Breath Sounds: Diminished    Fluids    Intake/Output Summary (Last 24 hours) at 10/28/18 1914  Last data filed at 10/28/18 1800   Gross per 24 hour   Intake          2301.25 ml   Output              400 ml   Net          1901.25 ml       Nutrition  Orders Placed This Encounter   Procedures   • Diet Order Diabetic     Standing Status:   Standing     Number of Occurrences:   1     Order Specific Question:   Diet:     Answer:   Diabetic [3]     Physical Exam   Constitutional: She is oriented to person, place, and time. She appears well-developed and well-nourished. No  distress.   HENT:   Head: Normocephalic and atraumatic.   Neck: No JVD present.   Cardiovascular: Normal rate and regular rhythm.    Murmur heard.  Pulmonary/Chest: Effort normal. No stridor. No respiratory distress. She has no wheezes. She has no rales.   Abdominal: Soft. There is no tenderness. There is no rebound and no guarding.   Musculoskeletal: She exhibits no edema.   Neurological: She is oriented to person, place, and time.   Skin: Skin is warm and dry. No rash noted. She is not diaphoretic.   Psychiatric: She has a normal mood and affect. Thought content normal.   Nursing note and vitals reviewed.      Recent Labs      10/27/18   2247  10/28/18   1240   WBC  15.9*  15.6*   RBC  4.71  3.49*   HEMOGLOBIN  15.0  11.1*   HEMATOCRIT  41.7  32.2*   MCV  88.5  92.3   MCH  31.8  31.8   MCHC  36.0*  34.5   RDW  37.7  41.1   PLATELETCT  219  150*   MPV  11.1  11.2     Recent Labs      10/27/18   2247  10/28/18   1240   SODIUM  133*  133*   POTASSIUM  3.8  3.7   CHLORIDE  98  106   CO2  22  18*   GLUCOSE  360*  255*   BUN  22  20   CREATININE  1.30  1.14   CALCIUM  9.8  7.3*     Recent Labs      10/28/18   0752   APTT  37.2*   INR  1.38*     Recent Labs      10/27/18   2247   BNPBTYPENAT  49     Recent Labs      10/28/18   0752   TRIGLYCERIDE  120   HDL  21*   LDL  60          Assessment/Plan     * Sepsis (HCC)- (present on admission)   Assessment & Plan    This is severe sepsis (with associated acute organ dysfunction) with demand ischemia and acute hypoxemic respiratory failure. Secondary to pyelonephritis. Elevated WBC at 15.9, tachycardia and hypotension. No lactic acidosis. Rapid response called overnight for hypoxia requiring 5-6L and tachycardia to 140.  This am pressures soft but holding MAP>60.  As pt is feeling better and exam is improved I do not think CVC is indicated at present.  As well is post 5 litres bolus so will hold further fluids  Cont Rocephin  Follow cultures        Pyelonephritis- (present on  admission)   Assessment & Plan    Urinalysis positive and with left flank pain.   - management as above  -  Anti emetics and pain control        Type 2 diabetes mellitus with hyperglycemia (HCC)- (present on admission)   Assessment & Plan    Uncontrolled and with hyperglycemia. Takes metformin and glimepiride. Per pharmacy, was given a script for Toujeo but had not been picked up yet. Trace ketones in the urine. Current elevation in blood sugars likely exacerbated by pyelo.   - A1C pending  - start lantus 10U  - insulin sliding scale  - hypoglycemia protocol  - DM diet        Demand ischemia (HCC)- (present on admission)   Assessment & Plan    Elevated trop from 0.2 to 0.85 since admission. EKG with no ischemic changes. Likely from sepsis and poor perfusion. No active chest pain.  - cardiology consulted  - trending trop  - echo pending        CHRISTIAN (acute kidney injury) (HCC)- (present on admission)   Assessment & Plan    Baseline unknown. CHRISTIAN from poor perfusion vs baseline? Cr at 1.3 on admission.  - s/p aggressive fluids overnight  - repeat in AM  - renal U/S given pyelo pending  - avoid nephrotoxic agents and renally dose medications        Acute hypoxemic respiratory failure (HCC)- (present on admission)   Assessment & Plan    No oxygen requirements on admission, then required 6L to maintain 91%. Rapid response was called overnight. CXR normal.  - RT to follow  - pain control for likely atelectasis  - incentive spirometer        HLD (hyperlipidemia)- (present on admission)   Assessment & Plan    Good repeat lipid panel. LDL 60   - tolerating atorvastatin, will continue        Hypotension- (present on admission)   Assessment & Plan    Hypotensive on admission, initially responded to IVF but continuing to trend down overnight despite additional boluses.  S/p sepsis bolus          Quality-Core Measures   Reviewed items::  EKG reviewed, Labs reviewed, Medications reviewed and Radiology images reviewed  DVT  prophylaxis pharmacological::  Heparin  DVT prophylaxis - mechanical:  SCDs  Ulcer Prophylaxis::  Not indicated  Antibiotics:  Treating active infection/contamination beyond 24 hours perioperative coverage

## 2018-10-29 NOTE — CARE PLAN
Problem: Safety  Goal: Will remain free from injury  Outcome: PROGRESSING AS EXPECTED  Pt demonstrates proper use of call light for assistance when getting OOB.     Problem: Discharge Barriers/Planning  Goal: Patient's continuum of care needs will be met  Outcome: PROGRESSING AS EXPECTED  Use of ipad  to ensure pt is understanding plan of care and allow for pt to fully express needs to care team.

## 2018-10-29 NOTE — PROGRESS NOTES
Pt went into A-fib RVR with a rate in the 170-180s 0514. Pt self converted back to SR 0517 with frequent PVCs and PACs. 12 lead achieved for a rhythm strip to capture changes. Pt now going in and out of a-fib as well as SR with frequent PVCs and occassional PACs. Charge RN notified. Cardiology notified. New orders received, see MAR.

## 2018-10-29 NOTE — PROGRESS NOTES
Report received from FABIÁN Espinosa. Pt resting comfortably at this time. No complaints of pain.  staying with pt over night. Charge RN notified. POC reviewed with pt. Pt in agreement.

## 2018-10-29 NOTE — CARE PLAN
Problem: Safety  Goal: Will remain free from falls  Outcome: PROGRESSING AS EXPECTED    Intervention: Assess risk factors for falls  Indira Alberto fall risk assessment complete.  Intervention: Implement fall precautions  Bed locked and in lowest position. Bed alarm on. Pt's belongings and call light within reach. Pt educated on importance of pressing call light PRN.

## 2018-10-29 NOTE — PROGRESS NOTES
[facility] Hospitalist Progress Note    Date of Service: 10/29/2018    Chief Complaint  68 y.o. female admitted 10/27/2018 with N, V, flank pain and F/Cs.  W/u showing dirty urine.  Hypotensive in ED and Dx of urosepsis/Pyelo.  Admitted to CICU    Interval Problem Update  Feeling better today.  Generally weak but no specific pain.  No N or V, no F or C.      Consultants/Specialty      Disposition  Cont in ICU as pressures borderline        Review of Systems   Constitutional: Negative for chills and fever.   Respiratory: Negative for cough and shortness of breath.    Cardiovascular: Negative for chest pain.   Gastrointestinal: Negative for abdominal pain, diarrhea, nausea and vomiting.   Musculoskeletal: Positive for joint pain and neck pain. Negative for back pain.   Skin: Negative for rash.   Neurological: Negative for dizziness, loss of consciousness and headaches.      Physical Exam  Laboratory/Imaging   Hemodynamics  Temp (24hrs), Av.6 °C (97.9 °F), Min:36.5 °C (97.7 °F), Max:36.7 °C (98 °F)   Temperature: 36.7 °C (98 °F)  Pulse  Av.4  Min: 86  Max: 154 Heart Rate (Monitored): (!) 106  Blood Pressure : (!) 95/63, NIBP: 113/61      Respiratory      Respiration: (!) 30, Pulse Oximetry: 94 %, O2 Daily Delivery Respiratory : Silicone Nasal Cannula        RUL Breath Sounds: Clear, RML Breath Sounds: Clear, RLL Breath Sounds: Diminished, JL Breath Sounds: Clear, LLL Breath Sounds: Diminished    Fluids    Intake/Output Summary (Last 24 hours) at 10/29/18 0559  Last data filed at 10/28/18 2211   Gross per 24 hour   Intake          3001.25 ml   Output              800 ml   Net          2201.25 ml       Nutrition  Orders Placed This Encounter   Procedures   • Diet Order Diabetic     Standing Status:   Standing     Number of Occurrences:   1     Order Specific Question:   Diet:     Answer:   Diabetic [3]     Physical Exam   Constitutional: She is oriented to person, place, and time. She appears well-developed and  well-nourished. No distress.   HENT:   Head: Normocephalic and atraumatic.   Eyes: Conjunctivae are normal. No scleral icterus.   Neck: No JVD present.   Cardiovascular: Normal rate and regular rhythm.    Murmur heard.  Pulmonary/Chest: Effort normal. No stridor. No respiratory distress. She has no wheezes. She has no rales.   Abdominal: Soft. There is no tenderness. There is no rebound and no guarding.   Musculoskeletal: She exhibits no edema.   Neurological: She is oriented to person, place, and time.   Skin: Skin is warm and dry. No rash noted. She is not diaphoretic.   Psychiatric: She has a normal mood and affect. Thought content normal.   Nursing note and vitals reviewed.      Recent Labs      10/27/18   2247  10/28/18   1240   WBC  15.9*  15.6*   RBC  4.71  3.49*   HEMOGLOBIN  15.0  11.1*   HEMATOCRIT  41.7  32.2*   MCV  88.5  92.3   MCH  31.8  31.8   MCHC  36.0*  34.5   RDW  37.7  41.1   PLATELETCT  219  150*   MPV  11.1  11.2     Recent Labs      10/27/18   2247  10/28/18   1240   SODIUM  133*  133*   POTASSIUM  3.8  3.7   CHLORIDE  98  106   CO2  22  18*   GLUCOSE  360*  255*   BUN  22  20   CREATININE  1.30  1.14   CALCIUM  9.8  7.3*     Recent Labs      10/28/18   0752   APTT  37.2*   INR  1.38*     Recent Labs      10/27/18   2247   BNPBTYPENAT  49     Recent Labs      10/28/18   0752   TRIGLYCERIDE  120   HDL  21*   LDL  60          Assessment/Plan     * Sepsis (HCC)- (present on admission)   Assessment & Plan    This is severe sepsis (with associated acute organ dysfunction) with demand ischemia and acute hypoxemic respiratory failure.   Source urinary  GNRs in blood and urine: likely EColi  Cont Rocephin  Repeat blood cultures tomorrow        Pyelonephritis- (present on admission)   Assessment & Plan    Urinalysis positive and with left flank pain.   - management as above  -  Anti emetics and pain control        Type 2 diabetes mellitus with hyperglycemia (HCC)- (present on admission)   Assessment &  Plan    Metformin and glimepride at home  A1c 10  SSI and lantus for the moment        Multifocal atrial tachycardia (HCC)   Assessment & Plan    Cardiology consulted          Demand ischemia (HCC)- (present on admission)   Assessment & Plan    Elevated trop from 0.2 to 0.85 since admission. EKG with no ischemic changes. Likely from sepsis and poor perfusion. No active chest pain.  - cardiology consulted  - trending trop  - echo pending        CHRISTIAN (acute kidney injury) (HCC)- (present on admission)   Assessment & Plan    Normalized function  Avoid nephrotoxins  Renal dose as appropriate        Acute hypoxemic respiratory failure (HCC)- (present on admission)   Assessment & Plan    No oxygen requirements on admission, then required 6L to maintain 91%. Rapid response was called overnight. CXR normal.  - RT to follow  - pain control for likely atelectasis  - incentive spirometer        HLD (hyperlipidemia)- (present on admission)   Assessment & Plan    Good repeat lipid panel. LDL 60   - tolerating atorvastatin, will continue        Hypotension- (present on admission)   Assessment & Plan    Hypotensive on admission, initially responded to IVF but continuing to trend down overnight despite additional boluses.  S/p sepsis bolus          Quality-Core Measures   Reviewed items::  EKG reviewed, Labs reviewed, Medications reviewed and Radiology images reviewed  DVT prophylaxis pharmacological::  Heparin  DVT prophylaxis - mechanical:  SCDs  Ulcer Prophylaxis::  Not indicated  Antibiotics:  Treating active infection/contamination beyond 24 hours perioperative coverage

## 2018-10-29 NOTE — PROGRESS NOTES
Monitor Summary:     Sinus rhythm/sinus tach  HR 's  .16/.08/.32    12 hour chart check complete.   2 RN skin check complete.

## 2018-10-29 NOTE — PROGRESS NOTES
Chart reviewed for AMI and HF quality measures. Neither diagnosis applicable at this time.    Will not follow.    Many thanks and please call FABIÁN Mendoza, [facility] at [phone] with questions M-F

## 2018-10-29 NOTE — CARE PLAN
Problem: Infection  Goal: Will remain free from infection  Outcome: PROGRESSING AS EXPECTED    Intervention: Assess signs and symptoms of infection  Sepsis protocol initiated and followed.   Intervention: Implement standard precautions and perform hand washing before and after patient contact  Complete.   Intervention: Give CDC handouts for infection prevention (infection prevention/hand washing, disease specific, and device specific) and document in Education  N/A  Intervention: Assess for removal of potential routes of infection, such as IV, central line, intra-arterial or urinary catheters  Assessed.

## 2018-10-29 NOTE — CARE PLAN
Problem: Knowledge Deficit  Goal: Knowledge of disease process/condition, treatment plan, diagnostic tests, and medications will improve    Intervention: Explain information regarding disease process/condition, treatment plan, diagnostic tests, and medications and document in education  POC discussed with pt and . No questions at this time.      Problem: Pain Management  Goal: Pain level will decrease to patient's comfort goal    Intervention: Follow pain managment plan developed in collaboration with patient and Interdisciplinary Team  Pain managed with PRN oxy and rest/ repositioning.

## 2018-10-30 PROBLEM — E87.6 HYPOKALEMIA: Status: ACTIVE | Noted: 2018-10-30

## 2018-10-30 LAB
ANION GAP SERPL CALC-SCNC: 8 MMOL/L (ref 0–11.9)
BACTERIA BLD CULT: ABNORMAL
BACTERIA UR CULT: ABNORMAL
BACTERIA UR CULT: ABNORMAL
BASOPHILS # BLD AUTO: 0.4 % (ref 0–1.8)
BASOPHILS # BLD: 0.04 K/UL (ref 0–0.12)
BUN SERPL-MCNC: 11 MG/DL (ref 8–22)
CALCIUM SERPL-MCNC: 8.5 MG/DL (ref 8.5–10.5)
CHLORIDE SERPL-SCNC: 106 MMOL/L (ref 96–112)
CO2 SERPL-SCNC: 25 MMOL/L (ref 20–33)
CREAT SERPL-MCNC: 0.81 MG/DL (ref 0.5–1.4)
EOSINOPHIL # BLD AUTO: 0.07 K/UL (ref 0–0.51)
EOSINOPHIL NFR BLD: 0.7 % (ref 0–6.9)
ERYTHROCYTE [DISTWIDTH] IN BLOOD BY AUTOMATED COUNT: 38.9 FL (ref 35.9–50)
GLUCOSE BLD-MCNC: 147 MG/DL (ref 65–99)
GLUCOSE BLD-MCNC: 172 MG/DL (ref 65–99)
GLUCOSE BLD-MCNC: 174 MG/DL (ref 65–99)
GLUCOSE BLD-MCNC: 192 MG/DL (ref 65–99)
GLUCOSE BLD-MCNC: 207 MG/DL (ref 65–99)
GLUCOSE SERPL-MCNC: 162 MG/DL (ref 65–99)
HCT VFR BLD AUTO: 31 % (ref 37–47)
HGB BLD-MCNC: 10.9 G/DL (ref 12–16)
IMM GRANULOCYTES # BLD AUTO: 0.05 K/UL (ref 0–0.11)
IMM GRANULOCYTES NFR BLD AUTO: 0.5 % (ref 0–0.9)
LYMPHOCYTES # BLD AUTO: 1.93 K/UL (ref 1–4.8)
LYMPHOCYTES NFR BLD: 19.1 % (ref 22–41)
MCH RBC QN AUTO: 31.8 PG (ref 27–33)
MCHC RBC AUTO-ENTMCNC: 35.2 G/DL (ref 33.6–35)
MCV RBC AUTO: 90.4 FL (ref 81.4–97.8)
MONOCYTES # BLD AUTO: 1.25 K/UL (ref 0–0.85)
MONOCYTES NFR BLD AUTO: 12.4 % (ref 0–13.4)
NEUTROPHILS # BLD AUTO: 6.77 K/UL (ref 2–7.15)
NEUTROPHILS NFR BLD: 66.9 % (ref 44–72)
NRBC # BLD AUTO: 0 K/UL
NRBC BLD-RTO: 0 /100 WBC
PLATELET # BLD AUTO: 157 K/UL (ref 164–446)
PMV BLD AUTO: 11.6 FL (ref 9–12.9)
POTASSIUM SERPL-SCNC: 3.4 MMOL/L (ref 3.6–5.5)
RBC # BLD AUTO: 3.43 M/UL (ref 4.2–5.4)
SIGNIFICANT IND 70042: ABNORMAL
SITE SITE: ABNORMAL
SODIUM SERPL-SCNC: 139 MMOL/L (ref 135–145)
SOURCE SOURCE: ABNORMAL
WBC # BLD AUTO: 10.1 K/UL (ref 4.8–10.8)

## 2018-10-30 PROCEDURE — 700102 HCHG RX REV CODE 250 W/ 637 OVERRIDE(OP): Performed by: HOSPITALIST

## 2018-10-30 PROCEDURE — A9270 NON-COVERED ITEM OR SERVICE: HCPCS | Performed by: HOSPITALIST

## 2018-10-30 PROCEDURE — 97162 PT EVAL MOD COMPLEX 30 MIN: CPT

## 2018-10-30 PROCEDURE — 700111 HCHG RX REV CODE 636 W/ 250 OVERRIDE (IP): Performed by: HOSPITALIST

## 2018-10-30 PROCEDURE — G8980 MOBILITY D/C STATUS: HCPCS | Mod: CI

## 2018-10-30 PROCEDURE — 87040 BLOOD CULTURE FOR BACTERIA: CPT

## 2018-10-30 PROCEDURE — 770006 HCHG ROOM/CARE - MED/SURG/GYN SEMI*

## 2018-10-30 PROCEDURE — 85025 COMPLETE CBC W/AUTO DIFF WBC: CPT

## 2018-10-30 PROCEDURE — 700105 HCHG RX REV CODE 258: Performed by: HOSPITALIST

## 2018-10-30 PROCEDURE — 80048 BASIC METABOLIC PNL TOTAL CA: CPT

## 2018-10-30 PROCEDURE — G8978 MOBILITY CURRENT STATUS: HCPCS | Mod: CI

## 2018-10-30 PROCEDURE — G8979 MOBILITY GOAL STATUS: HCPCS | Mod: CI

## 2018-10-30 PROCEDURE — 97165 OT EVAL LOW COMPLEX 30 MIN: CPT

## 2018-10-30 PROCEDURE — G8989 SELF CARE D/C STATUS: HCPCS | Mod: CH

## 2018-10-30 PROCEDURE — 82962 GLUCOSE BLOOD TEST: CPT | Mod: 91

## 2018-10-30 PROCEDURE — G8988 SELF CARE GOAL STATUS: HCPCS | Mod: CH

## 2018-10-30 PROCEDURE — G8987 SELF CARE CURRENT STATUS: HCPCS | Mod: CH

## 2018-10-30 PROCEDURE — 99232 SBSQ HOSP IP/OBS MODERATE 35: CPT | Performed by: HOSPITALIST

## 2018-10-30 RX ORDER — ASPIRIN 81 MG/1
81 TABLET, CHEWABLE ORAL DAILY
Status: DISCONTINUED | OUTPATIENT
Start: 2018-10-30 | End: 2018-11-01 | Stop reason: HOSPADM

## 2018-10-30 RX ORDER — POTASSIUM CHLORIDE 20 MEQ/1
40 TABLET, EXTENDED RELEASE ORAL ONCE
Status: COMPLETED | OUTPATIENT
Start: 2018-10-30 | End: 2018-10-30

## 2018-10-30 RX ORDER — INSULIN GLARGINE 100 [IU]/ML
12 INJECTION, SOLUTION SUBCUTANEOUS EVERY EVENING
Status: DISCONTINUED | OUTPATIENT
Start: 2018-10-30 | End: 2018-11-01 | Stop reason: HOSPADM

## 2018-10-30 RX ADMIN — ATORVASTATIN CALCIUM 10 MG: 10 TABLET, FILM COATED ORAL at 20:53

## 2018-10-30 RX ADMIN — HEPARIN SODIUM 5000 UNITS: 5000 INJECTION, SOLUTION INTRAVENOUS; SUBCUTANEOUS at 05:34

## 2018-10-30 RX ADMIN — CEFTRIAXONE SODIUM 2 G: 2 INJECTION, POWDER, FOR SOLUTION INTRAMUSCULAR; INTRAVENOUS at 20:53

## 2018-10-30 RX ADMIN — HEPARIN SODIUM 5000 UNITS: 5000 INJECTION, SOLUTION INTRAVENOUS; SUBCUTANEOUS at 20:53

## 2018-10-30 RX ADMIN — INSULIN GLARGINE 12 UNITS: 100 INJECTION, SOLUTION SUBCUTANEOUS at 20:54

## 2018-10-30 RX ADMIN — HEPARIN SODIUM 5000 UNITS: 5000 INJECTION, SOLUTION INTRAVENOUS; SUBCUTANEOUS at 13:48

## 2018-10-30 RX ADMIN — ASPIRIN 81 MG: 81 TABLET, CHEWABLE ORAL at 17:59

## 2018-10-30 RX ADMIN — Medication 2 TABLET: at 05:34

## 2018-10-30 RX ADMIN — POTASSIUM CHLORIDE 40 MEQ: 1500 TABLET, EXTENDED RELEASE ORAL at 10:52

## 2018-10-30 ASSESSMENT — PAIN SCALES - GENERAL
PAINLEVEL_OUTOF10: 0
PAINLEVEL_OUTOF10: 5
PAINLEVEL_OUTOF10: 0
PAINLEVEL_OUTOF10: 0

## 2018-10-30 ASSESSMENT — ENCOUNTER SYMPTOMS
HEMOPTYSIS: 0
COUGH: 0
FEVER: 0
NERVOUS/ANXIOUS: 0
LOSS OF CONSCIOUSNESS: 0
FLANK PAIN: 0
HALLUCINATIONS: 0
NAUSEA: 0
VOMITING: 0
ORTHOPNEA: 0
MEMORY LOSS: 0
FALLS: 0
DIARRHEA: 0
SPUTUM PRODUCTION: 0
EYE DISCHARGE: 0
ABDOMINAL PAIN: 0
BLURRED VISION: 0
SEIZURES: 0
EYE PAIN: 0
BLOOD IN STOOL: 0
NECK PAIN: 0
PALPITATIONS: 0
WEAKNESS: 0
FOCAL WEAKNESS: 0
HEADACHES: 0
STRIDOR: 0
BACK PAIN: 0
SHORTNESS OF BREATH: 0
EYE REDNESS: 0
SPEECH CHANGE: 0

## 2018-10-30 ASSESSMENT — COGNITIVE AND FUNCTIONAL STATUS - GENERAL
SUGGESTED CMS G CODE MODIFIER MOBILITY: CI
DAILY ACTIVITIY SCORE: 24
SUGGESTED CMS G CODE MODIFIER DAILY ACTIVITY: CH
CLIMB 3 TO 5 STEPS WITH RAILING: A LITTLE
MOBILITY SCORE: 23

## 2018-10-30 ASSESSMENT — ACTIVITIES OF DAILY LIVING (ADL): TOILETING: INDEPENDENT

## 2018-10-30 ASSESSMENT — LIFESTYLE VARIABLES: SUBSTANCE_ABUSE: 0

## 2018-10-30 ASSESSMENT — GAIT ASSESSMENTS
DISTANCE (FEET): 200
GAIT LEVEL OF ASSIST: STAND BY ASSIST

## 2018-10-30 NOTE — PROGRESS NOTES
Cardiology Follow Up Progress Note    Date of Service  10/29/2018    Attending Physician  Michelle Moon M.D.    Chief Complaint   Follow-up arrhythmia    HPI  Evy Martinez is a 68 y.o. female admitted 10/27/2018 with urosepsis, cardiology was called about troponin elevation consistent with demand ischemia.  We came back on board when it appeared that she possibly could have been in atrial fibrillation last night though upon careful review of her rhythm she was in multifocal atrial tachycardia.    Interim Events  Multifocal atrial tachycardia resolved after diltiazem, was present for only several seconds at a time    Review of Systems  Review of Systems   Respiratory: Positive for shortness of breath.    Cardiovascular: Negative.        Vital signs in last 24 hours  Temp:  [36.7 °C (98.1 °F)-37.2 °C (98.9 °F)] 37.2 °C (98.9 °F)  Pulse:  [] 94  Resp:  [18-37] 37    Physical Exam  Physical Exam   Constitutional: She is oriented to person, place, and time. She appears well-developed and well-nourished. No distress.   Pleasant, [place]ian-speaking woman in no distress   HENT:   Head: Normocephalic and atraumatic.   Eyes: Pupils are equal, round, and reactive to light. EOM are normal.   Neck: No JVD present. No tracheal deviation present.   Cardiovascular: Normal rate and regular rhythm.  Exam reveals no gallop and no friction rub.    No murmur heard.  Pulmonary/Chest: Effort normal and breath sounds normal. No stridor. No respiratory distress. She has no wheezes. She has no rales. She exhibits no tenderness.   No wheezes or crackles appreciated with the patient breathing via nasal cannula.  She did grimace a bit on deep inspiration   Abdominal: Soft. Bowel sounds are normal. She exhibits no distension.   Musculoskeletal: She exhibits no edema.   Neurological: She is alert and oriented to person, place, and time.   Skin: Skin is warm and dry. No rash noted. She is not diaphoretic. No erythema.   Psychiatric: She  has a normal mood and affect. Her behavior is normal. Judgment normal.   Nursing note and vitals reviewed.      Lab Review  Lab Results   Component Value Date/Time    WBC 11.8 (H) 10/29/2018 12:00 PM    RBC 3.47 (L) 10/29/2018 12:00 PM    HEMOGLOBIN 10.8 (L) 10/29/2018 12:00 PM    HEMATOCRIT 31.7 (L) 10/29/2018 12:00 PM    MCV 91.4 10/29/2018 12:00 PM    MCH 31.1 10/29/2018 12:00 PM    MCHC 34.1 10/29/2018 12:00 PM    MPV 11.7 10/29/2018 12:00 PM      Lab Results   Component Value Date/Time    SODIUM 133 (L) 10/29/2018 12:00 PM    POTASSIUM 3.5 (L) 10/29/2018 12:00 PM    CHLORIDE 104 10/29/2018 12:00 PM    CO2 23 10/29/2018 12:00 PM    GLUCOSE 219 (H) 10/29/2018 12:00 PM    BUN 13 10/29/2018 12:00 PM    CREATININE 0.85 10/29/2018 12:00 PM      Lab Results   Component Value Date/Time    ASTSGOT 13 10/27/2018 10:47 PM    ALTSGPT 17 10/27/2018 10:47 PM     Lab Results   Component Value Date/Time    CHOLSTRLTOT 105 10/28/2018 07:52 AM    LDL 60 10/28/2018 07:52 AM    HDL 21 (A) 10/28/2018 07:52 AM    TRIGLYCERIDE 120 10/28/2018 07:52 AM    TROPONINI 0.58 (H) 10/28/2018 12:40 PM       Recent Labs      10/27/18   2247   BNPBTYPENAT  49       Cardiac Imaging and Procedures Review  EKG:  My personal interpretation of the EKG dated 10/29/2018 is sinus rhythm    Telemetry demonstrates multifocal atrial tachycardia    Assessment/Plan    Multifocal atrial tachycardia: Treatment should be directed at her underlying lung disease.  Diltiazem is reasonable especially as needed.  Anticoagulation is not indicated.    Demand ischemia: Per previous notes    Thank you for allowing me to participate in the care of this patient.  Cardiology will sign off on this patient    Please contact me with any questions.    Jose Arambula M.D.   Cardiologist, [facility]  [phone]

## 2018-10-30 NOTE — CARE PLAN
Problem: Communication  Goal: The ability to communicate needs accurately and effectively will improve  Outcome: PROGRESSING AS EXPECTED    Intervention: [place] patient and significant other/support system to call light to alert staff of needs  Patient frequently oriented to alert staff of needs  Intervention: Reorient patient to environment as needed  Patient oriented to environment  Intervention: Educate patient and significant other/support system about the plan of care, procedures, treatments, medications and allow for questions  Patient and family educated regarding plan of care and medications. Questions they had were answered      Problem: Safety  Goal: Will remain free from falls  Outcome: PROGRESSING AS EXPECTED    Intervention: Assess risk factors for falls  Assessed risk factors for falls  Intervention: Implement fall precautions   10/29/18 4838   OTHER   Environmental Precautions Treaded Slipper Socks on Patient;Personal Belongings, Wastebasket, Call Bell etc. in Easy Reach;Transferred to Stronger Side;Report Given to Other Health Care Providers Regarding Fall Risk;Bed in Low Position;Communication Sign for Patients & Families;Mobility Assessed & Appropriate Sign Placed   Chair/Bed Strip Alarm Yes - Alarm On   Bed Alarm Yes - Alarm On

## 2018-10-30 NOTE — CARE PLAN
Problem: Safety  Goal: Will remain free from injury  Outcome: PROGRESSING AS EXPECTED    Intervention: Provide assistance with mobility  Pt is standby assist. Uses call light appropriately. Bed is in low, locked position. Pt wearing treaded slipper socks. Bed alarm on.       Problem: Respiratory:  Goal: Respiratory status will improve  Outcome: PROGRESSING SLOWER THAN EXPECTED    Intervention: Administer and titrate oxygen therapy  Oxygen titrated to 1L NC.

## 2018-10-30 NOTE — PROGRESS NOTES
[facility] Hospitalist Progress Note    Date of Service: 10/30/2018    Chief Complaint  68 y.o. female admitted 10/27/2018 with N, V, flank pain and F/Cs.  W/u showing dirty urine.  Hypotensive in ED and Dx of urosepsis/Pyelo.  Admitted to CICU    Interval Problem Update  No overnight events  Afebrile  Day 3 ceftriaxone     Denies CP or abd pain    Consultants/Specialty  Card  CC    Disposition  medical        Review of Systems   Constitutional: Negative for fever and malaise/fatigue.   HENT: Negative for congestion, ear discharge and nosebleeds.    Eyes: Negative for blurred vision, pain, discharge and redness.   Respiratory: Negative for cough, hemoptysis, sputum production, shortness of breath and stridor.    Cardiovascular: Negative for chest pain, palpitations, orthopnea and leg swelling.   Gastrointestinal: Negative for abdominal pain, blood in stool, diarrhea, melena, nausea and vomiting.   Genitourinary: Negative for dysuria, flank pain and hematuria.   Musculoskeletal: Positive for joint pain. Negative for back pain, falls and neck pain.   Skin: Negative.    Neurological: Negative for speech change, focal weakness, seizures, loss of consciousness, weakness and headaches.   Psychiatric/Behavioral: Negative for hallucinations, memory loss and substance abuse. The patient is not nervous/anxious.    All other systems reviewed and are negative.     Physical Exam  Laboratory/Imaging   Hemodynamics  Temp (24hrs), Av.1 °C (98.7 °F), Min:36.3 °C (97.3 °F), Max:37.5 °C (99.5 °F)   Temperature: 36.3 °C (97.3 °F)  Pulse  Av.7  Min: 83  Max: 154 Heart Rate (Monitored): 89  NIBP: 134/74      Respiratory      Respiration: (!) 27, Pulse Oximetry: 96 %        RUL Breath Sounds: Clear, RML Breath Sounds: Clear, RLL Breath Sounds: Diminished, JL Breath Sounds: Clear, LLL Breath Sounds: Diminished    Fluids    Intake/Output Summary (Last 24 hours) at 10/30/18 1020  Last data filed at 10/30/18 0600   Gross per 24 hour    Intake            412.5 ml   Output             1750 ml   Net          -1337.5 ml       Nutrition  Orders Placed This Encounter   Procedures   • Diet Order Diabetic     Standing Status:   Standing     Number of Occurrences:   1     Order Specific Question:   Diet:     Answer:   Diabetic [3]     Physical Exam   Constitutional: She is oriented to person, place, and time. She appears well-developed and well-nourished.   HENT:   Head: Normocephalic and atraumatic.   Right Ear: External ear normal.   Left Ear: External ear normal.   Mouth/Throat: No oropharyngeal exudate.   Eyes: Conjunctivae are normal. Right eye exhibits no discharge. Left eye exhibits no discharge. No scleral icterus.   Neck: Neck supple. No JVD present. No tracheal deviation present.   Cardiovascular: Normal rate and regular rhythm.  Exam reveals no gallop and no friction rub.    Murmur heard.  Pulmonary/Chest: Effort normal and breath sounds normal. No stridor. No respiratory distress. She has no wheezes. She has no rales. She exhibits no tenderness.   Abdominal: Soft. Bowel sounds are normal. She exhibits no distension and no mass. There is no tenderness. There is no rebound and no guarding.   Musculoskeletal: She exhibits no edema or tenderness.   Neurological: She is alert and oriented to person, place, and time. No cranial nerve deficit. She exhibits normal muscle tone.   Skin: Skin is warm and dry. She is not diaphoretic. No cyanosis. Nails show no clubbing.   Psychiatric: She has a normal mood and affect. Her behavior is normal. Thought content normal.   Nursing note and vitals reviewed.      Recent Labs      10/28/18   1240  10/29/18   1200  10/30/18   0600   WBC  15.6*  11.8*  10.1   RBC  3.49*  3.47*  3.43*   HEMOGLOBIN  11.1*  10.8*  10.9*   HEMATOCRIT  32.2*  31.7*  31.0*   MCV  92.3  91.4  90.4   MCH  31.8  31.1  31.8   MCHC  34.5  34.1  35.2*   RDW  41.1  39.9  38.9   PLATELETCT  150*  160*  157*   MPV  11.2  11.7  11.6     Recent  Labs      10/28/18   1240  10/29/18   1200  10/30/18   0600   SODIUM  133*  133*  139   POTASSIUM  3.7  3.5*  3.4*   CHLORIDE  106  104  106   CO2  18*  23  25   GLUCOSE  255*  219*  162*   BUN  20  13  11   CREATININE  1.14  0.85  0.81   CALCIUM  7.3*  7.9*  8.5     Recent Labs      10/28/18   0752   APTT  37.2*   INR  1.38*     Recent Labs      10/27/18   2247   BNPBTYPENAT  49     Recent Labs      10/28/18   0752   TRIGLYCERIDE  120   HDL  21*   LDL  60          Assessment/Plan     * Sepsis (HCC)- (present on admission)   Assessment & Plan    This is severe sepsis (with associated acute organ dysfunction) with demand ischemia and acute hypoxemic respiratory failure.   Source urinary  With E. coli bacteremia  Continue ceftriaxone repeat cultures        Pyelonephritis- (present on admission)   Assessment & Plan    Continue ceftriaxone  Repeat blood cultures        Type 2 diabetes mellitus with hyperglycemia (HCC)- (present on admission)   Assessment & Plan    Uncontrolled with hyperglycemia   Increase Lantus to 12 units and continue sliding scale insulin        Hypokalemia   Assessment & Plan    Replete and monitor         Multifocal atrial tachycardia (HCC)   Assessment & Plan    resolved          Demand ischemia (HCC)- (present on admission)   Assessment & Plan    Troponin trended down  Echo revealed  No prior study is available for comparison.   Left ventricular ejection fraction is visually estimated to be 60%.  Mild AV sclerosis without reduced excursion.  Estimated right ventricular systolic pressure  is 33 mmHg.  Continue aspirin and Lipitor    Dr Tomas recommended MPI        CHRISTIAN (acute kidney injury) (HCC)- (present on admission)   Assessment & Plan    Resolved        Acute hypoxemic respiratory failure (HCC)- (present on admission)   Assessment & Plan    Improved wean off oxygen as tolerated        HLD (hyperlipidemia)- (present on admission)   Assessment & Plan    Continue atorvastatin         Hypotension- (present on admission)   Assessment & Plan    Resolved          Quality-Core Measures   Reviewed items::  EKG reviewed, Labs reviewed, Medications reviewed and Radiology images reviewed  Matthews catheter::  No Matthews  DVT prophylaxis pharmacological::  Heparin  DVT prophylaxis - mechanical:  SCDs  Ulcer Prophylaxis::  Not indicated  Antibiotics:  Treating active infection/contamination beyond 24 hours perioperative coverage        Plan of care reviewed with patient and  and  discussed with nursing staff

## 2018-10-30 NOTE — PROGRESS NOTES
Monitor Summary     Sinus Rhythm-Tach 80-100s  .14/.08/.32    12 Hour Chart Check    2 RN Skin Check

## 2018-10-30 NOTE — DISCHARGE PLANNING
Transfer Center:    Received call from Elizabeth @ Bed Day [phone].  She reported that Dr. Yang had reported that patient is stable for transfer to [facility] based on [employer] insurance.    Call placed to [facility] @ [phone].  Spoke with Indira.  She reported that there was no ICU tonight but would check in am.  If patient downgraded from ICU status would be able to place tonight.    Call placed to [employer] @ [phone].  Spoke with Rhianna.  She reported that patient has [employer] H[employer].  She stated that transfer was not necessary.  Will compensate for the same DRG.      Call placed to bedside RN.  Spoke with May.  Updated on status.    Call placed to unit SW [phone].  Left VM.    Plan:  TIERRA SALDAÑA will follow up in am with attending MD and insurance if to pursue the transfer to [facility].

## 2018-10-30 NOTE — PROGRESS NOTES
· 2 RN skin check complete.   · Devices in pla2ce oxygen.  · Skin assessed under devices oxygen.  · Confirmed pressure ulcers found on n/a.  · New potential pressure ulcers noted on n/a. Wound consult and MIDAS placed.  · The following interventions in place: ear foam in use, two RN skin check upon arrival to unit  · Pt skin is intact otherwise.

## 2018-10-30 NOTE — PROGRESS NOTES
Assumed care of patient at 1415, labs and orders noted, no medications to administer, assessment complete. I am in agreement with the previous nurses assessment.  Pt arrived to unit via wheelchair with transport,  at bedside, no s/s of distress. Pt is Polish-speaking and RN was able to communicate with patient in Estonian and pt  translates inttermittantly.  Language line in use PRN.  Pt has no complaints of pain.  Pt oriented to room and unit.  Communication board updated.  Fall risk precautions in place.   Pt belongings at bedside, belongings and call light within reach.     Pt able to verbalize needs, all needs met at this time.

## 2018-10-31 ENCOUNTER — APPOINTMENT (OUTPATIENT)
Dept: RADIOLOGY | Facility: MEDICAL CENTER | Age: 68
DRG: 871 | End: 2018-10-31
Attending: HOSPITALIST
Payer: MEDICARE

## 2018-10-31 LAB
ANION GAP SERPL CALC-SCNC: 8 MMOL/L (ref 0–11.9)
BASOPHILS # BLD AUTO: 0.4 % (ref 0–1.8)
BASOPHILS # BLD: 0.03 K/UL (ref 0–0.12)
BUN SERPL-MCNC: 9 MG/DL (ref 8–22)
CALCIUM SERPL-MCNC: 8.8 MG/DL (ref 8.5–10.5)
CHLORIDE SERPL-SCNC: 107 MMOL/L (ref 96–112)
CO2 SERPL-SCNC: 25 MMOL/L (ref 20–33)
CREAT SERPL-MCNC: 0.75 MG/DL (ref 0.5–1.4)
EOSINOPHIL # BLD AUTO: 0.13 K/UL (ref 0–0.51)
EOSINOPHIL NFR BLD: 1.6 % (ref 0–6.9)
ERYTHROCYTE [DISTWIDTH] IN BLOOD BY AUTOMATED COUNT: 38.1 FL (ref 35.9–50)
GLUCOSE BLD-MCNC: 125 MG/DL (ref 65–99)
GLUCOSE BLD-MCNC: 135 MG/DL (ref 65–99)
GLUCOSE BLD-MCNC: 172 MG/DL (ref 65–99)
GLUCOSE BLD-MCNC: 219 MG/DL (ref 65–99)
GLUCOSE SERPL-MCNC: 146 MG/DL (ref 65–99)
HCT VFR BLD AUTO: 32.1 % (ref 37–47)
HGB BLD-MCNC: 11.3 G/DL (ref 12–16)
IMM GRANULOCYTES # BLD AUTO: 0.03 K/UL (ref 0–0.11)
IMM GRANULOCYTES NFR BLD AUTO: 0.4 % (ref 0–0.9)
LYMPHOCYTES # BLD AUTO: 1.95 K/UL (ref 1–4.8)
LYMPHOCYTES NFR BLD: 24.3 % (ref 22–41)
MCH RBC QN AUTO: 31.6 PG (ref 27–33)
MCHC RBC AUTO-ENTMCNC: 35.2 G/DL (ref 33.6–35)
MCV RBC AUTO: 89.7 FL (ref 81.4–97.8)
MONOCYTES # BLD AUTO: 0.9 K/UL (ref 0–0.85)
MONOCYTES NFR BLD AUTO: 11.2 % (ref 0–13.4)
NEUTROPHILS # BLD AUTO: 5 K/UL (ref 2–7.15)
NEUTROPHILS NFR BLD: 62.1 % (ref 44–72)
NRBC # BLD AUTO: 0 K/UL
NRBC BLD-RTO: 0 /100 WBC
PLATELET # BLD AUTO: 166 K/UL (ref 164–446)
PMV BLD AUTO: 11.1 FL (ref 9–12.9)
POTASSIUM SERPL-SCNC: 3.9 MMOL/L (ref 3.6–5.5)
RBC # BLD AUTO: 3.58 M/UL (ref 4.2–5.4)
SODIUM SERPL-SCNC: 140 MMOL/L (ref 135–145)
WBC # BLD AUTO: 8 K/UL (ref 4.8–10.8)

## 2018-10-31 PROCEDURE — A9270 NON-COVERED ITEM OR SERVICE: HCPCS | Performed by: INTERNAL MEDICINE

## 2018-10-31 PROCEDURE — 700102 HCHG RX REV CODE 250 W/ 637 OVERRIDE(OP): Performed by: HOSPITALIST

## 2018-10-31 PROCEDURE — A9270 NON-COVERED ITEM OR SERVICE: HCPCS | Performed by: HOSPITALIST

## 2018-10-31 PROCEDURE — 700111 HCHG RX REV CODE 636 W/ 250 OVERRIDE (IP): Performed by: HOSPITALIST

## 2018-10-31 PROCEDURE — 700102 HCHG RX REV CODE 250 W/ 637 OVERRIDE(OP): Performed by: INTERNAL MEDICINE

## 2018-10-31 PROCEDURE — 36415 COLL VENOUS BLD VENIPUNCTURE: CPT

## 2018-10-31 PROCEDURE — 85025 COMPLETE CBC W/AUTO DIFF WBC: CPT

## 2018-10-31 PROCEDURE — 80048 BASIC METABOLIC PNL TOTAL CA: CPT

## 2018-10-31 PROCEDURE — 99232 SBSQ HOSP IP/OBS MODERATE 35: CPT | Performed by: INTERNAL MEDICINE

## 2018-10-31 PROCEDURE — 770006 HCHG ROOM/CARE - MED/SURG/GYN SEMI*

## 2018-10-31 PROCEDURE — 82962 GLUCOSE BLOOD TEST: CPT | Mod: 91

## 2018-10-31 RX ORDER — CIPROFLOXACIN 500 MG/1
500 TABLET, FILM COATED ORAL EVERY 12 HOURS
Status: DISCONTINUED | OUTPATIENT
Start: 2018-10-31 | End: 2018-11-01 | Stop reason: HOSPADM

## 2018-10-31 RX ADMIN — ASPIRIN 81 MG: 81 TABLET, CHEWABLE ORAL at 05:10

## 2018-10-31 RX ADMIN — HEPARIN SODIUM 5000 UNITS: 5000 INJECTION, SOLUTION INTRAVENOUS; SUBCUTANEOUS at 20:33

## 2018-10-31 RX ADMIN — Medication 2 TABLET: at 05:10

## 2018-10-31 RX ADMIN — CIPROFLOXACIN HYDROCHLORIDE 500 MG: 500 TABLET, FILM COATED ORAL at 17:47

## 2018-10-31 RX ADMIN — ATORVASTATIN CALCIUM 10 MG: 10 TABLET, FILM COATED ORAL at 20:32

## 2018-10-31 RX ADMIN — HEPARIN SODIUM 5000 UNITS: 5000 INJECTION, SOLUTION INTRAVENOUS; SUBCUTANEOUS at 14:04

## 2018-10-31 RX ADMIN — HEPARIN SODIUM 5000 UNITS: 5000 INJECTION, SOLUTION INTRAVENOUS; SUBCUTANEOUS at 05:10

## 2018-10-31 RX ADMIN — INSULIN GLARGINE 12 UNITS: 100 INJECTION, SOLUTION SUBCUTANEOUS at 17:52

## 2018-10-31 ASSESSMENT — ENCOUNTER SYMPTOMS
FLANK PAIN: 0
FEVER: 0
PHOTOPHOBIA: 0
SPUTUM PRODUCTION: 0
ORTHOPNEA: 0
HEADACHES: 0
BLOOD IN STOOL: 0
DIARRHEA: 0
NERVOUS/ANXIOUS: 0
LOSS OF CONSCIOUSNESS: 0
STRIDOR: 0
NECK PAIN: 0
WEAKNESS: 0
HALLUCINATIONS: 0
COUGH: 0
VOMITING: 0
SHORTNESS OF BREATH: 0
EYE REDNESS: 0
FALLS: 0
BLURRED VISION: 0
CHILLS: 0
SEIZURES: 0
EYE PAIN: 0
FOCAL WEAKNESS: 0
NAUSEA: 0
PALPITATIONS: 0
BACK PAIN: 0
MEMORY LOSS: 0
SPEECH CHANGE: 0

## 2018-10-31 ASSESSMENT — PAIN SCALES - GENERAL
PAINLEVEL_OUTOF10: 0

## 2018-10-31 ASSESSMENT — LIFESTYLE VARIABLES: SUBSTANCE_ABUSE: 0

## 2018-10-31 NOTE — PROGRESS NOTES
Pt was unable to complete cardiac stress test for today d/t having caffeine this AM. Pt to be NPO at midnight and no caffeine for 24 hours. Order placed.

## 2018-10-31 NOTE — CARE PLAN
Problem: Communication  Goal: The ability to communicate needs accurately and effectively will improve  Outcome: PROGRESSING AS EXPECTED  Pt Surinamese speaking only, translation used to assist in insuring all pt needs are being met.    Problem: Safety  Goal: Will remain free from falls  Outcome: PROGRESSING AS EXPECTED  Pt low fall risk, pt wearing treaded socks, bed locked and in lowest position, bed alarm not indicated.  Pt call light and phone within reach.

## 2018-10-31 NOTE — CARE PLAN
Problem: Safety  Goal: Will remain free from falls  Outcome: PROGRESSING AS EXPECTED  Pt is not a fall risk at this time. Pt calls appropriately before getting OOB, pt steady on feet. Pt bed in lowest and locked position, call light within reach.     Problem: Infection  Goal: Will remain free from infection  Outcome: PROGRESSING AS EXPECTED  Pt educated on S/S of infection and importance of hand hygiene.

## 2018-10-31 NOTE — PROGRESS NOTES
Assumed care of pt, received report from day shift RN, pt assessed.  Pt has no complaints of pain at this time.  Pt is A&O x4 and Georgian speaking only.  Pt low fall risk, wearing treaded socks, bed locked and in lowest position, bed alarm not indicated.  Pt instructed to call for assistance prior to getting OOB, pt verbalized understanding.  Call light, phone, and personal belongings within reach.

## 2018-10-31 NOTE — DISCHARGE PLANNING
Anticipated Discharge Disposition: TBD    Action: LSW conducted chart review. No AD on file. No significant discharge planning notes in hx.    Pt A&OX4. Currently on 2L O2. Pt transferred from ICU. Therapy notes indicate recommendation for Home Health.     Barriers to Discharge: None indicated at this time.    Plan: LSW continue to assess for discharge needs.

## 2018-10-31 NOTE — FACE TO FACE
Face to Face Supporting Documentation - Home Health    The encounter with this patient was in whole or in part the primary reason for home health admission.    Date of encounter:   Patient:                    MRN:                       YOB: 2020  Evy Martinez  [id number]  1950     Home health to see patient for:  Skilled Nursing care for assessment, interventions & education, Physical Therapy evaluation and treatment and Occupational therapy evaluation and treatment    Skilled need for:  New Onset Medical Diagnosis sepsis    Skilled nursing interventions to include:  Line/Drain/Airway education and care    Homebound status evidenced by:  Need the aid of supportive devices such as crutches, canes, wheelchairs or walkers. Leaving home requires a considerable and taxing effort. There is a normal inability to leave the home.    Community Physician to provide follow up care: Ailyn Shane M.D.     Optional Interventions? No      I certify the face to face encounter for this home health care referral meets the CMS requirements and the encounter/clinical assessment with the patient was, in whole, or in part, for the medical condition(s) listed above, which is the primary reason for home health care. Based on my clinical findings: the service(s) are medically necessary, support the need for home health care, and the homebound criteria are met.  I certify that this patient has had a face to face encounter by myself.  Amanda Salmeron M.D. - NPI: [id number]

## 2018-10-31 NOTE — PROGRESS NOTES
Assumed care of pt at 0700. Received report from RN. Pt A&Ox4. Assessment complete. Labs reviewed. Pt denies pain, chest pain, dizziness, SOB at this time. Pt mostly Serbian speaking. Pt up to bathroom SBA, calls appropriately. PIV patent running IVF TKO for abx therapy. Pt and RN discussed plan of care. Pt questions answered. Pt needs are met at this time. Bed in lowest and locked position. Call light within reach. Upper bed rails up. Hourly rounding in place.

## 2018-10-31 NOTE — PROGRESS NOTES
[facility] Hospitalist Progress Note    Date of Service: 10/31/2018    Chief Complaint  68 y.o. female admitted 10/27/2018 with N, V, flank pain and F/Cs.  W/u showing dirty urine.  Hypotensive in ED and Dx of urosepsis/Pyelo.  Admitted to CICU    Interval Problem Update  10/31.  Patient has been stable and no complaints overnight.  Stress test today.  Possibly demand ischemia.  Continue on antibiotics.  Change IV Rocephin to Cipro today. Patient's pain is local, 3-4/10, intermittent and does not radiate to other location, sharp and with some tingling. Can be controlled by pain meds.      Consultants/Specialty  Card  CC    Disposition  Home when stable        Review of Systems   Constitutional: Negative for chills, fever and malaise/fatigue.   HENT: Negative for congestion, ear discharge and nosebleeds.    Eyes: Negative for blurred vision, photophobia, pain and redness.   Respiratory: Negative for cough, sputum production, shortness of breath and stridor.    Cardiovascular: Negative for chest pain, palpitations, orthopnea and leg swelling.   Gastrointestinal: Negative for blood in stool, diarrhea, melena, nausea and vomiting.   Genitourinary: Negative for dysuria, flank pain and hematuria.   Musculoskeletal: Positive for joint pain. Negative for back pain, falls and neck pain.   Skin: Negative.    Neurological: Negative for speech change, focal weakness, seizures, loss of consciousness, weakness and headaches.   Psychiatric/Behavioral: Negative for hallucinations, memory loss and substance abuse. The patient is not nervous/anxious.    All other systems reviewed and are negative.     Physical Exam  Laboratory/Imaging   Hemodynamics  Temp (24hrs), Av.6 °C (97.8 °F), Min:36.3 °C (97.3 °F), Max:36.8 °C (98.2 °F)   Temperature: 36.4 °C (97.6 °F)  Pulse  Av.5  Min: 77  Max: 154    Blood Pressure : 130/76      Respiratory      Respiration: 16, Pulse Oximetry: 96 %        RUL Breath Sounds: Clear, RML Breath Sounds:  Clear, RLL Breath Sounds: Diminished, JL Breath Sounds: Clear, LLL Breath Sounds: Diminished    Fluids    Intake/Output Summary (Last 24 hours) at 10/31/18 1417  Last data filed at 10/31/18 1350   Gross per 24 hour   Intake              695 ml   Output                0 ml   Net              695 ml       Nutrition  Orders Placed This Encounter   Procedures   • Diet Order Diabetic, Cardiac     Standing Status:   Standing     Number of Occurrences:   1     Order Specific Question:   Diet:     Answer:   Diabetic [3]     Order Specific Question:   Diet:     Answer:   Cardiac [6]   • Diet NPO     Standing Status:   Standing     Number of Occurrences:   8     Order Specific Question:   Restrict to:     Answer:   Sips with Medications [3]     Comments:   NO CAFFEINE     Physical Exam   Constitutional: She is oriented to person, place, and time. She appears well-developed and well-nourished. No distress.   HENT:   Head: Normocephalic and atraumatic.   Right Ear: External ear normal.   Left Ear: External ear normal.   Mouth/Throat: No oropharyngeal exudate.   Eyes: Conjunctivae are normal. Right eye exhibits no discharge. No scleral icterus.   Neck: Normal range of motion. Neck supple. No JVD present. No thyromegaly present.   Cardiovascular: Normal rate and regular rhythm.  Exam reveals no gallop and no friction rub.    Murmur heard.  Pulmonary/Chest: Effort normal and breath sounds normal. No stridor. No respiratory distress. She has no rales. She exhibits no tenderness.   Abdominal: Soft. Bowel sounds are normal. She exhibits no distension and no mass. There is no tenderness. There is no guarding.   Musculoskeletal: She exhibits no edema or tenderness.   Neurological: She is alert and oriented to person, place, and time. No cranial nerve deficit. She exhibits normal muscle tone.   Skin: Skin is warm and dry. No cyanosis. Nails show no clubbing.   Psychiatric: She has a normal mood and affect. Her behavior is normal.  Thought content normal.   Nursing note and vitals reviewed.      Recent Labs      10/29/18   1200  10/30/18   0600  10/31/18   0030   WBC  11.8*  10.1  8.0   RBC  3.47*  3.43*  3.58*   HEMOGLOBIN  10.8*  10.9*  11.3*   HEMATOCRIT  31.7*  31.0*  32.1*   MCV  91.4  90.4  89.7   MCH  31.1  31.8  31.6   MCHC  34.1  35.2*  35.2*   RDW  39.9  38.9  38.1   PLATELETCT  160*  157*  166   MPV  11.7  11.6  11.1     Recent Labs      10/29/18   1200  10/30/18   0600  10/31/18   0030   SODIUM  133*  139  140   POTASSIUM  3.5*  3.4*  3.9   CHLORIDE  104  106  107   CO2  23  25  25   GLUCOSE  219*  162*  146*   BUN  13  11  9   CREATININE  0.85  0.81  0.75   CALCIUM  7.9*  8.5  8.8                     Current Facility-Administered Medications:   •  ciprofloxacin (CIPRO) tablet 500 mg, 500 mg, Oral, Q12HRS, Amanda Salmeron M.D.  •  insulin glargine (LANTUS) injection 12 Units, 12 Units, Subcutaneous, Q EVENING, Surya Mcneill M.D., 12 Units at 10/30/18 2054  •  aspirin (ASA) chewable tab 81 mg, 81 mg, Oral, DAILY, Surya Mcneill M.D., 81 mg at 10/31/18 0510  •  insulin regular (HUMULIN R) injection 1-6 Units, 1-6 Units, Subcutaneous, 4X/DAY ACHS, 2 Units at 10/31/18 1223 **AND** Accu-Chek ACHS, , , Q AC AND BEDTIME(S) **AND** NOTIFY MD and PharmD, , , Once **AND** glucose 4 g chewable tablet 16 g, 16 g, Oral, Q15 MIN PRN **AND** dextrose 50% (D50W) injection 25 mL, 25 mL, Intravenous, Q15 MIN PRN, Michelle Moon M.D.  •  Respiratory Care per Protocol, , Nebulization, Continuous RT, Michelle K Moon, M.D.  •  atorvastatin (LIPITOR) tablet 10 mg, 10 mg, Oral, Nightly, Michelle Moon M.D., 10 mg at 10/30/18 2053  •  oxyCODONE immediate-release (ROXICODONE) tablet 5 mg, 5 mg, Oral, Q4HRS PRN, 5 mg at 10/28/18 2306 **OR** oxyCODONE immediate release (ROXICODONE) tablet 10 mg, 10 mg, Oral, Q4HRS PRN, Clay Menchaca D.O.  •  senna-docusate (PERICOLACE or SENOKOT S) 8.6-50 MG per tablet 2 Tab, 2 Tab, Oral, BID, 2 Tab  at 10/31/18 0510 **AND** polyethylene glycol/lytes (MIRALAX) PACKET 1 Packet, 1 Packet, Oral, QDAY PRN **AND** magnesium hydroxide (MILK OF MAGNESIA) suspension 30 mL, 30 mL, Oral, QDAY PRN **AND** bisacodyl (DULCOLAX) suppository 10 mg, 10 mg, Rectal, QDAY PRN, Abril Mayo M.D.  •  ondansetron (ZOFRAN) syringe/vial injection 4 mg, 4 mg, Intravenous, Q4HRS PRN, Abril Mayo M.D., 4 mg at 10/28/18 0225  •  ondansetron (ZOFRAN ODT) dispertab 4 mg, 4 mg, Oral, Q4HRS PRN, Abril Mayo M.D.  •  heparin injection 5,000 Units, 5,000 Units, Subcutaneous, Q8HRS, Abril Mayo M.D., 5,000 Units at 10/31/18 1404    EC-ECHOCARDIOGRAM COMPLETE W/O CONT   Final Result      US-RENAL   Final Result         1. Unremarkable kidneys. No hydronephrosis.   2. Trace amount of ascites in the hepatorenal recess.   3. Slightly increased hepatic echogenicity, which may be due to mild steatosis or hepatocellular disease.      DX-CHEST-PORTABLE (1 VIEW)   Final Result      No acute cardiopulmonary abnormality.      NM-CARDIAC STRESS TEST    (Results Pending)        Assessment/Plan     * Sepsis (HCC)- (present on admission)   Assessment & Plan    This is severe sepsis (with associated acute organ dysfunction) with demand ischemia and acute hypoxemic respiratory failure.   Source urinary  With E. coli bacteremia  Patient's E. coli sensitive to fluoroquinolones.  Change IV Rocephin to p.o. Cipro.  And for total course of 2 weeks.        Pyelonephritis- (present on admission)   Assessment & Plan    Change ceftriaxone to Cipro due to pansensitive E. coli from culture  Repeat blood cultures currently no growth to date for 48 hours.        Type 2 diabetes mellitus with hyperglycemia (HCC)- (present on admission)   Assessment & Plan    Uncontrolled with hyperglycemia   Without long-term use of insulin, without complication  Continue Lantus to 12 units and continue sliding scale insulin        Hypokalemia- (present on admission)    Assessment & Plan    Replete and monitor         Multifocal atrial tachycardia (HCC)- (present on admission)   Assessment & Plan    resolved          Demand ischemia (HCC)- (present on admission)   Assessment & Plan    Troponin trended down  Echo revealed  No prior study is available for comparison.   Left ventricular ejection fraction is visually estimated to be 60%.  Mild AV sclerosis without reduced excursion.  Estimated right ventricular systolic pressure  is 33 mmHg.  Continue aspirin and Lipitor    Dr Tomas recommended MPI, will perform today        CHRISTIAN (acute kidney injury) (HCC)- (present on admission)   Assessment & Plan    Resolved        Acute hypoxemic respiratory failure (HCC)- (present on admission)   Assessment & Plan    Improved wean off oxygen as tolerated        HLD (hyperlipidemia)- (present on admission)   Assessment & Plan    Continue atorvastatin        Hypotension- (present on admission)   Assessment & Plan    Resolved          Quality-Core Measures   Reviewed items::  EKG reviewed, Labs reviewed, Medications reviewed and Radiology images reviewed  Matthews catheter::  No Matthews  DVT prophylaxis pharmacological::  Heparin  DVT prophylaxis - mechanical:  SCDs  Ulcer Prophylaxis::  Not indicated  Antibiotics:  Treating active infection/contamination beyond 24 hours perioperative coverage     I have discussed with RN and PIOTR and SW and other consultants about patient's plan.        Plan of care reviewed with patient and  and  discussed with nursing staff

## 2018-11-01 ENCOUNTER — APPOINTMENT (OUTPATIENT)
Dept: RADIOLOGY | Facility: MEDICAL CENTER | Age: 68
DRG: 871 | End: 2018-11-01
Attending: HOSPITALIST
Payer: MEDICARE

## 2018-11-01 ENCOUNTER — PATIENT OUTREACH (OUTPATIENT)
Dept: HEALTH INFORMATION MANAGEMENT | Facility: OTHER | Age: 68
End: 2018-11-01

## 2018-11-01 VITALS
HEART RATE: 84 BPM | DIASTOLIC BLOOD PRESSURE: 87 MMHG | BODY MASS INDEX: 30.3 KG/M2 | SYSTOLIC BLOOD PRESSURE: 159 MMHG | OXYGEN SATURATION: 90 % | TEMPERATURE: 96.7 F | RESPIRATION RATE: 16 BRPM | HEIGHT: 60 IN | WEIGHT: 154.32 LBS

## 2018-11-01 PROBLEM — M25.512 CHRONIC LEFT SHOULDER PAIN: Status: ACTIVE | Noted: 2018-11-01

## 2018-11-01 PROBLEM — G89.29 CHRONIC LEFT SHOULDER PAIN: Status: ACTIVE | Noted: 2018-11-01

## 2018-11-01 LAB
GLUCOSE BLD-MCNC: 132 MG/DL (ref 65–99)
GLUCOSE BLD-MCNC: 138 MG/DL (ref 65–99)

## 2018-11-01 PROCEDURE — 700102 HCHG RX REV CODE 250 W/ 637 OVERRIDE(OP): Performed by: INTERNAL MEDICINE

## 2018-11-01 PROCEDURE — 700111 HCHG RX REV CODE 636 W/ 250 OVERRIDE (IP): Performed by: HOSPITALIST

## 2018-11-01 PROCEDURE — 700102 HCHG RX REV CODE 250 W/ 637 OVERRIDE(OP): Performed by: HOSPITALIST

## 2018-11-01 PROCEDURE — A9502 TC99M TETROFOSMIN: HCPCS

## 2018-11-01 PROCEDURE — A9270 NON-COVERED ITEM OR SERVICE: HCPCS | Performed by: HOSPITALIST

## 2018-11-01 PROCEDURE — 99239 HOSP IP/OBS DSCHRG MGMT >30: CPT | Performed by: INTERNAL MEDICINE

## 2018-11-01 PROCEDURE — 82962 GLUCOSE BLOOD TEST: CPT

## 2018-11-01 PROCEDURE — A9270 NON-COVERED ITEM OR SERVICE: HCPCS | Performed by: INTERNAL MEDICINE

## 2018-11-01 PROCEDURE — 700111 HCHG RX REV CODE 636 W/ 250 OVERRIDE (IP)

## 2018-11-01 RX ORDER — ASPIRIN 81 MG/1
81 TABLET, CHEWABLE ORAL DAILY
Qty: 100 TAB | Refills: 1 | Status: SHIPPED | OUTPATIENT
Start: 2018-11-02

## 2018-11-01 RX ORDER — CIPROFLOXACIN 500 MG/1
500 TABLET, FILM COATED ORAL EVERY 12 HOURS
Qty: 22 TAB | Refills: 0 | Status: SHIPPED | OUTPATIENT
Start: 2018-11-01 | End: 2018-11-12

## 2018-11-01 RX ORDER — REGADENOSON 0.08 MG/ML
INJECTION, SOLUTION INTRAVENOUS
Status: COMPLETED
Start: 2018-11-01 | End: 2018-11-01

## 2018-11-01 RX ORDER — TRAMADOL HYDROCHLORIDE 50 MG/1
50 TABLET ORAL EVERY 4 HOURS PRN
Qty: 10 TAB | Refills: 0 | Status: SHIPPED | OUTPATIENT
Start: 2018-11-01 | End: 2018-11-06

## 2018-11-01 RX ADMIN — ASPIRIN 81 MG: 81 TABLET, CHEWABLE ORAL at 05:37

## 2018-11-01 RX ADMIN — REGADENOSON 0.4 MG: 0.08 INJECTION, SOLUTION INTRAVENOUS at 11:38

## 2018-11-01 RX ADMIN — HEPARIN SODIUM 5000 UNITS: 5000 INJECTION, SOLUTION INTRAVENOUS; SUBCUTANEOUS at 05:38

## 2018-11-01 RX ADMIN — CIPROFLOXACIN HYDROCHLORIDE 500 MG: 500 TABLET, FILM COATED ORAL at 05:38

## 2018-11-01 RX ADMIN — OXYCODONE HYDROCHLORIDE 5 MG: 5 TABLET ORAL at 05:46

## 2018-11-01 ASSESSMENT — PAIN SCALES - GENERAL
PAINLEVEL_OUTOF10: 2
PAINLEVEL_OUTOF10: 0
PAINLEVEL_OUTOF10: 5

## 2018-11-01 NOTE — PROGRESS NOTES
Assumed care of pt at 1930. Report received and bedside rounding completed with day RN. Pt in bed resting comfortably denies any pain at this time and is calm. No SOB, or in any acute distress.  Fall precautions in place,   - Treaded non slip socks. Call light and pt belongings within reach - pt makes needs known - hourly rounding in place. See flowsheets for further assessment.

## 2018-11-01 NOTE — PROGRESS NOTES
"Pt back to room from cardiac stress test. Pt resting comfortably in bed with  at bedside. Pt denies pain, SOB, chest pain at this time. Pt states \"I am hungry and tired.\"   "

## 2018-11-01 NOTE — DISCHARGE SUMMARY
Discharge Summary    CHIEF COMPLAINT ON ADMISSION  Chief Complaint   Patient presents with   • Painful Urination   • N/V       Reason for Admission  Fever; Body Aches; Painful urination    Admission Date  10/27/2018    CODE STATUS  Full Code    HPI & HOSPITAL COURSE   68 y.o. female admitted 10/27/2018 with N, V, flank pain and F/Cs.  W/u showing dirty urine.  Hypotensive in ED and Dx of urosepsis/Pyelo.  Admitted to CICU with treatment of sepsis. Full sepsis protocol including lactic acid checks was implemented.  Patient tolerated fluid resuscitation and IV antibiotics Rocephin.  Patient's blood culture positive for pansensitive E. coli.  Patient does have gram-negative bacteremia.  Repeat blood culture negative after initiated with antibiotics.  Patient was also showing signs of elevated troponin, cardiologist was consulted and the stress test did not show any significant signs of ischemic myocardium.  Elevated troponin likely due to demand ischemia from sepsis.  Patient's pain sensitive E. coli sensitive to ciprofloxacin.  Patient was then changed to oral Cipro for total course of 2 weeks.  Patient tolerated oral antibiotics.  He stable and ready to be discharged home with antibiotics.       Therefore, she is discharged in good and stable condition to home with close outpatient follow-up.    The patient met 2-midnight criteria for an inpatient stay at the time of discharge.    Discharge Date  11/1/2018    FOLLOW UP ITEMS POST DISCHARGE  none    DISCHARGE DIAGNOSES      Sepsis (HCC) POA: Yes    Pyelonephritis POA: Yes    Type 2 diabetes mellitus with hyperglycemia (HCC) POA: Yes    Hypotension POA: Yes    HLD (hyperlipidemia) POA: Yes    Acute hypoxemic respiratory failure (HCC) POA: Yes    CHRISTIAN (acute kidney injury) (HCC) POA: Yes    Demand ischemia (HCC) POA: Yes    Multifocal atrial tachycardia (HCC) POA: Yes    Hypokalemia POA: Yes    Chronic left shoulder pain POA: Yes    FOLLOW UP  Follow-up with PCP as  needed  No follow-up provider specified.    MEDICATIONS ON DISCHARGE     Medication List      START taking these medications      Instructions   aspirin 81 MG Chew chewable tablet  Commonly known as:  ASA   Take 1 Tab by mouth every day.  Dose:  81 mg     ciprofloxacin 500 MG Tabs  Commonly known as:  CIPRO   Take 1 Tab by mouth every 12 hours for 11 days.  Dose:  500 mg     tramadol 50 MG Tabs  Commonly known as:  ULTRAM   Take 1 Tab by mouth every four hours as needed for Moderate Pain or Severe Pain for up to 5 days.  Dose:  50 mg        CONTINUE taking these medications      Instructions   atorvastatin 10 MG Tabs  Commonly known as:  LIPITOR   Take 10 mg by mouth every evening.  Dose:  10 mg     glimepiride 4 MG Tabs  Commonly known as:  AMARYL   Take 4 mg by mouth every morning.  Dose:  4 mg     lisinopril-hydrochlorothiazide 20-25 MG per tablet  Commonly known as:  PRINZIDE, ZESTORETIC   Take 1 Tab by mouth every day.  Dose:  1 Tab     metFORMIN 500 MG Tabs  Commonly known as:  GLUCOPHAGE   Take 1,000 mg by mouth 2 times a day.  Dose:  1000 mg            Allergies  No Known Allergies    DIET  Orders Placed This Encounter   Procedures   • Diet Order Diabetic, Cardiac     Standing Status:   Standing     Number of Occurrences:   1     Order Specific Question:   Diet:     Answer:   Diabetic [3]     Order Specific Question:   Diet:     Answer:   Cardiac [6]       ACTIVITY  As tolerated.  Weight bearing as tolerated    CONSULTATIONS  card    PROCEDURES  none    NM-CARDIAC STRESS TEST   Final Result   NUCLEAR IMAGING INTERPRETATION   No evidence of significant jeopardized viable myocardium or prior myocardial    infarction.   Normal left ventricular size, ejection fraction, and wall motion.   EC-ECHOCARDIOGRAM COMPLETE W/O CONT   Final Result     CONCLUSIONS  No prior study is available for comparison.   Left ventricular ejection fraction is visually estimated to be 60%.  Mild AV sclerosis without reduced  excursion.  Estimated right ventricular systolic pressure  is 33 mmHg.   US-RENAL   Final Result   1. Unremarkable kidneys. No hydronephrosis.  2. Trace amount of ascites in the hepatorenal recess.  3. Slightly increased hepatic echogenicity, which may be due to mild steatosis or hepatocellular disease.      1. Unremarkable kidneys. No hydronephrosis.   2. Trace amount of ascites in the hepatorenal recess.   3. Slightly increased hepatic echogenicity, which may be due to mild steatosis or hepatocellular disease.      DX-CHEST-PORTABLE (1 VIEW)   Final Result      No acute cardiopulmonary abnormality.          PE:  Gen: AAOx3, NAD  Eyes: PELLA  Neck: no JVD, no lymphadenopathy  Cardia: RRR, no mrg  Lungs: CTAB, no rales, rhonci or wheezing  Abd: NABS, soft, non extended, no mass  EXT: No C/C/E, peripheral pulse 2+ b/l  Neuro: CN II-XII intact, non focal, reflex 2+ symmetrical  Skin: Intact, no lesion, warm  Psych: Appropriate.      LABORATORY  Lab Results   Component Value Date    SODIUM 140 10/31/2018    POTASSIUM 3.9 10/31/2018    CHLORIDE 107 10/31/2018    CO2 25 10/31/2018    GLUCOSE 146 (H) 10/31/2018    BUN 9 10/31/2018    CREATININE 0.75 10/31/2018        Lab Results   Component Value Date    WBC 8.0 10/31/2018    HEMOGLOBIN 11.3 (L) 10/31/2018    HEMATOCRIT 32.1 (L) 10/31/2018    PLATELETCT 166 10/31/2018        Total time of the discharge process exceeds 39 minutes.

## 2018-11-01 NOTE — PROGRESS NOTES
Pt to be discharged to home with home health. Pt given discharge paperwork and instructions, all questions answered. PIV removed prior to discharge. Pt to be brought down to car with [facility] transport. Paper Rx given to patient.

## 2018-11-01 NOTE — DISCHARGE PLANNING
Agency/Facility Name: [facility]  Spoke To: Received Fax  Outcome: Patient accepted.  [facility] to follow up with [facility] admissions once Patient is ready to D/C.

## 2018-11-01 NOTE — CARE PLAN
Problem: Discharge Barriers/Planning  Goal: Patient's continuum of care needs will be met  Outcome: PROGRESSING AS EXPECTED  Pt accepted to [facility]. Awaiting patient to get cardiac stress test today before discharge. Pt updated on discharge plan, pt and family agreeable at this time.     Problem: Pain Management  Goal: Pain level will decrease to patient's comfort goal  Outcome: PROGRESSING AS EXPECTED  Pt denies states pain in bilat arms this AM. Pt medicated early this AM with pain medication, pt refusing pain medication at this time.

## 2018-11-01 NOTE — CARE PLAN
Problem: Safety  Goal: Will remain free from injury  Bed locked and in lowest position. Hourly rounding in place. Call light with in reach.     Problem: Knowledge Deficit  Goal: Knowledge of disease process/condition, treatment plan, diagnostic tests, and medications will improve    Intervention: Assess knowledge level of disease process/condition, treatment plan, diagnostic tests, and medications  Pt verbalizes understanding of stress test and the need to be NPO at midnight. All questions answered at bedside.

## 2018-11-01 NOTE — PROGRESS NOTES
Assumed care of pt at 0700. Received report from RN. Pt A&Ox4. Assessment complete. Labs reviewed. Pt denies pain this AM. Pt did however c/o bilat arm pain last night, pt medicated for pain per MAR. Pt denies chest pain, SOB, dizziness. Pt NPO for cardiac stress test this AM. Pt and RN discussed plan of care. Pt questions answered. Pt needs are met at this time. Bed in lowest and locked position. Call light within reach. Upper bed rails up. Hourly rounding in place.

## 2018-11-04 LAB
BACTERIA BLD CULT: NORMAL
BACTERIA BLD CULT: NORMAL
SIGNIFICANT IND 70042: NORMAL
SIGNIFICANT IND 70042: NORMAL
SITE SITE: NORMAL
SITE SITE: NORMAL
SOURCE SOURCE: NORMAL
SOURCE SOURCE: NORMAL

## 2019-03-27 NOTE — ADDENDUM NOTE
Encounter addended by: Rosalina Sanchez R.N. on: 3/27/2019  4:23 PM<BR>    Actions taken: Flowsheet accepted

## 2020-08-14 NOTE — THERAPY
"Occupational Therapy Evaluation completed.   Functional Status:  Pt very pleasant & co-operative.  Pt was supervision for supine to sit EOB.  Pt able to don her own pants with supervision.  Pt was supervision for ADL transfers.  Pt did require 1L O2 N/C with activity.  Pt's  present & very supportive.  Pt encouraged to be OOB for all meals & amb in halls with staff or family.  Plan of Care: Patient with no further skilled OT needs in the acute care setting at this time  Discharge Recommendations:  Equipment: No Equipment Needed. Post-acute therapy Discharge to home with outpatient or home health for additional skilled therapy services.    Pt should progress well & be able to D/C home with  once medically cleared.    See \"Rehab Therapy-Acute\" Patient Summary Report for complete documentation.    " Hemostasis: None

## 2021-01-15 DIAGNOSIS — Z23 NEED FOR VACCINATION: ICD-10-CM

## 2024-02-05 NOTE — DISCHARGE PLANNING
Anticipated Discharge Disposition: TBD    Action: In rounds, LSw updated medical team. LSw reports pt does not need to tx to [facility] as [facility] CM RN indicated pt can stay and [facility] will cover.    Barriers to Discharge: TBD    Plan: f/u w/ medical team     No